# Patient Record
Sex: MALE | Race: WHITE | NOT HISPANIC OR LATINO
[De-identification: names, ages, dates, MRNs, and addresses within clinical notes are randomized per-mention and may not be internally consistent; named-entity substitution may affect disease eponyms.]

---

## 2017-12-22 ENCOUNTER — APPOINTMENT (OUTPATIENT)
Dept: CARDIOLOGY | Facility: CLINIC | Age: 47
End: 2017-12-22
Payer: COMMERCIAL

## 2017-12-22 PROCEDURE — 99214 OFFICE O/P EST MOD 30 MIN: CPT

## 2017-12-22 PROCEDURE — 93000 ELECTROCARDIOGRAM COMPLETE: CPT

## 2018-05-25 ENCOUNTER — RECORD ABSTRACTING (OUTPATIENT)
Age: 48
End: 2018-05-25

## 2018-05-25 DIAGNOSIS — Z78.9 OTHER SPECIFIED HEALTH STATUS: ICD-10-CM

## 2018-05-31 ENCOUNTER — APPOINTMENT (OUTPATIENT)
Dept: CARDIOLOGY | Facility: CLINIC | Age: 48
End: 2018-05-31
Payer: COMMERCIAL

## 2018-05-31 VITALS
RESPIRATION RATE: 18 BRPM | DIASTOLIC BLOOD PRESSURE: 65 MMHG | HEART RATE: 66 BPM | SYSTOLIC BLOOD PRESSURE: 110 MMHG | WEIGHT: 150 LBS | BODY MASS INDEX: 25.61 KG/M2 | HEIGHT: 64 IN

## 2018-05-31 DIAGNOSIS — H90.5 UNSPECIFIED SENSORINEURAL HEARING LOSS: ICD-10-CM

## 2018-05-31 PROCEDURE — 93306 TTE W/DOPPLER COMPLETE: CPT

## 2018-05-31 PROCEDURE — 93000 ELECTROCARDIOGRAM COMPLETE: CPT

## 2018-05-31 PROCEDURE — 99214 OFFICE O/P EST MOD 30 MIN: CPT

## 2018-11-06 ENCOUNTER — RX RENEWAL (OUTPATIENT)
Age: 48
End: 2018-11-06

## 2018-11-08 ENCOUNTER — RX RENEWAL (OUTPATIENT)
Age: 48
End: 2018-11-08

## 2018-11-12 ENCOUNTER — APPOINTMENT (OUTPATIENT)
Dept: CARDIOLOGY | Facility: CLINIC | Age: 48
End: 2018-11-12
Payer: COMMERCIAL

## 2018-11-12 VITALS
HEART RATE: 62 BPM | DIASTOLIC BLOOD PRESSURE: 58 MMHG | SYSTOLIC BLOOD PRESSURE: 100 MMHG | BODY MASS INDEX: 26.46 KG/M2 | HEIGHT: 64 IN | RESPIRATION RATE: 18 BRPM | WEIGHT: 155 LBS

## 2018-11-12 PROCEDURE — 99214 OFFICE O/P EST MOD 30 MIN: CPT

## 2018-11-12 PROCEDURE — 93306 TTE W/DOPPLER COMPLETE: CPT

## 2018-11-12 PROCEDURE — 93000 ELECTROCARDIOGRAM COMPLETE: CPT

## 2018-11-12 NOTE — REASON FOR VISIT
[FreeTextEntry1] : Patient presents for cardiac reevaluation with no new specific complaints.\par Exercises avidly without any difficulty.\par No other new intercurrent medical problems\par Here today for an echocardiogram and to review.

## 2018-11-12 NOTE — PHYSICAL EXAM
[FreeTextEntry1] :                    Well appearing and nourished with no obvious deformities or distress.\par \par Eyes: \par No conjunctival injection and no xanthelasmas.\par HEENT: \par Normocephalic.Normal oral mucosa. No pallor or cyanosis\par Neck: \par No jugular venous distension. with normal A and V wave forms. No palpable adenopathy.\par Cardiovascular: \par Normal rate and rhythm with normal S1, S2 and a grade 2/6 systolic murmur, 2/6 diastolic blow. Distal arterial pulses are normal. No significant peripheral edema.\par Pulmonary: \par Lungs are clear to auscultation and percussion. Normal respiratory pattern without any accessory muscle use\par Abdomen: \par Soft, non-tender ; no palpable organomegaly or masses.\par Extremities:\par No digital clubbing, cyanosis or ischemic changes.\par Skin: \par No skin lesions, rashes, ulcers or xanthomas.\par Psychiatric: \par Alert and oriented to person, place and time. Left ear sensorineural hearing loss. Appropriate mood and affect.

## 2018-11-12 NOTE — HISTORY OF PRESENT ILLNESS
[FreeTextEntry1] : The patient presents here for cardiac reevaluation with regard to:\par 1.	A bicuspid aortic valve.\par 2.	Severe aortic insufficiency.\par 3.	A thoracic aortic aneurysm.\par 4.	Left ventricular dilatation.\par \par Physically fairly active, but not exercising as much as he had been.  Nonetheless, denies any shortness of breath, chest pain, or palpitations.\par \par Other medical issues are that of acoustic neuroma with sensorineural hearing loss, left sided.\par \par A cardiac MRI performed 6/12/18 revealed the following:\par A dilated left ventricle with an ejection fraction of 52%.\par Mildly dilated right ventricle with normal function\par Left atrial dilatation\par A bicuspid aortic valve with severe regurgitation.\par A dilated ascending aorta maximal diameter 4.3 cm.

## 2019-03-25 ENCOUNTER — APPOINTMENT (OUTPATIENT)
Dept: OTOLARYNGOLOGY | Facility: CLINIC | Age: 49
End: 2019-03-25
Payer: COMMERCIAL

## 2019-03-25 VITALS
SYSTOLIC BLOOD PRESSURE: 102 MMHG | DIASTOLIC BLOOD PRESSURE: 66 MMHG | BODY MASS INDEX: 26.46 KG/M2 | WEIGHT: 155 LBS | HEIGHT: 64 IN | HEART RATE: 62 BPM

## 2019-03-25 PROCEDURE — 99204 OFFICE O/P NEW MOD 45 MIN: CPT | Mod: 25

## 2019-03-25 PROCEDURE — 92567 TYMPANOMETRY: CPT

## 2019-03-25 PROCEDURE — 92557 COMPREHENSIVE HEARING TEST: CPT

## 2019-03-25 RX ORDER — METHYLPHENIDATE HYDROCHLORIDE 5 MG/1
5 TABLET ORAL
Refills: 0 | Status: DISCONTINUED | COMMUNITY
End: 2019-03-25

## 2019-04-05 NOTE — REASON FOR VISIT
[Spouse] : spouse [FreeTextEntry2] : saw Dr. Gaines numerous years ago; here for f/u for hearing check

## 2019-04-05 NOTE — HISTORY OF PRESENT ILLNESS
[de-identified] : 49 y/o male here who saw Dr. Ocampo numerous years ago; here for f/u for hearing check. Pt has hearing loss of the left ear. No history of hearing aides and acoustic neuroma- craniotomy done in 3/2012. Pt occasional c/o of numbness to the temporal area; resolving on its own, due to stress as per pt. Pt denies otalgia, otorrhea, ear infections, tinnitus, dizziness, vertigo or headaches related to hearing. Last saw Dr Muñoz in 2017 - MRI no growth (5 yr anniversary) - no vertigo. Does not feel like needs aid.  Had sound bite in past - did not like it.

## 2019-05-31 ENCOUNTER — APPOINTMENT (OUTPATIENT)
Dept: CARDIOLOGY | Facility: CLINIC | Age: 49
End: 2019-05-31
Payer: COMMERCIAL

## 2019-05-31 PROCEDURE — 93306 TTE W/DOPPLER COMPLETE: CPT

## 2019-06-17 ENCOUNTER — OTHER (OUTPATIENT)
Age: 49
End: 2019-06-17

## 2019-06-27 ENCOUNTER — APPOINTMENT (OUTPATIENT)
Dept: CARDIOLOGY | Facility: CLINIC | Age: 49
End: 2019-06-27
Payer: COMMERCIAL

## 2019-06-27 ENCOUNTER — RECORD ABSTRACTING (OUTPATIENT)
Age: 49
End: 2019-06-27

## 2019-06-27 PROCEDURE — 93308 TTE F-UP OR LMTD: CPT

## 2019-06-27 PROCEDURE — ZZZZZ: CPT

## 2019-09-06 ENCOUNTER — OTHER (OUTPATIENT)
Age: 49
End: 2019-09-06

## 2020-02-27 ENCOUNTER — APPOINTMENT (OUTPATIENT)
Dept: CARDIOLOGY | Facility: CLINIC | Age: 50
End: 2020-02-27
Payer: COMMERCIAL

## 2020-02-27 ENCOUNTER — NON-APPOINTMENT (OUTPATIENT)
Age: 50
End: 2020-02-27

## 2020-02-27 VITALS
RESPIRATION RATE: 16 BRPM | SYSTOLIC BLOOD PRESSURE: 98 MMHG | BODY MASS INDEX: 27.14 KG/M2 | HEIGHT: 64 IN | HEART RATE: 60 BPM | DIASTOLIC BLOOD PRESSURE: 60 MMHG | WEIGHT: 159 LBS | OXYGEN SATURATION: 98 %

## 2020-02-27 PROCEDURE — 93306 TTE W/DOPPLER COMPLETE: CPT

## 2020-02-27 PROCEDURE — 99214 OFFICE O/P EST MOD 30 MIN: CPT

## 2020-02-27 PROCEDURE — 93000 ELECTROCARDIOGRAM COMPLETE: CPT

## 2020-02-27 NOTE — REASON FOR VISIT
[FreeTextEntry1] :  49 year old male  patient presents here for cardiac reevaluation with regard to:\par 1.	A bicuspid aortic valve.\par 2.	Severe aortic insufficiency.\par 3.	A thoracic aortic aneurysm.\par 4.	Left ventricular dilatation.

## 2020-02-27 NOTE — HISTORY OF PRESENT ILLNESS
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  Nonetheless, denies any shortness of breath, chest pain, or palpitations.\par \par Drinks 3+ cups of coffee a day.\par Compliant with his Quinapril.\par \par Other medical issues are that of acoustic neuroma with sensorineural hearing loss, left sided.\par \par 8/30/19 Cardiac MRI reviewed\par Moderate aortic insufficieny\par Mild enlargement of the aortic root measuring 4.2 cm\par Mod. enlargement of the ascending aorta measuring 4.8 cm\par Sev. enlargement of the left ventricle   EF = 50%\par RV EF 51%\par \par Results of his echocardiogram will be reviewed.\par \par

## 2020-02-27 NOTE — ASSESSMENT
[FreeTextEntry1] : ECG: Normal sinus is 66 BPM , occ. PVCs \par \par Echo 2/27/20\par EF 55-60%\par Dilated cardiomyopathy 6.5 cm\par Aortic valve is bicuspid\par Severe AI\par Mod. dilation of the ascending aorta measuring 4.40 cm\par \par \par 8/30/19 Cardiac MRI \par Moderate aortic insufficieny\par Mild enlargement of the aortic root measuring 4.2 cm\par Mod. enlargement of the ascending aorta measuring 4.8 cm\par Sev. enlargement of the left ventricle \par LV EF 50%\par \par A cardiac MRI performed 6/12/18 revealed the following:\par A dilated left ventricle with an ejection fraction of 52%.\par Mildly dilated right ventricle with normal function\par Left atrial dilatation\par A bicuspid aortic valve with severe regurgitation.\par A dilated ascending aorta maximal diameter 4.3 cm.\par \par \par Echocardiogram performed today: 11/12/18:\par Moderately dilated left ventricle with a left ventricular ejection fraction of 55-60%.\par A bicuspid aortic valve with severe insufficiency\par Moderately dilated ascending aorta unchanged from prior.\par Compared with the prior echocardiogram 5/31/18, there seems to be a decrease in left ventricular end-diastolic dimension and an improvement in the left ventricular ejection fraction.\par \par Lab Data 5/16/19\par Chol. 254\par HDL 59\par  ( 117 ,3/21/18)\par Tri. 121\par LFTs WNL\par HGB 14.9\par Creat. 0.88\par \par \par Echocardiogram 5/31/18\par Moderately dilated left ventricle with low normal ventricular systolic function. LVEF approximately 50-55%.\par Bicuspid aortic valve with severe  insufficiency\par Moderately severe dilatation of the ascending aorta measuring maximally 4.6 cm.\par \par Impression\par 1. May echo revealing linear echodensity suggestive of aortic dissection, EF 55%. This was  repeated  in June     which was negative. Likely artifact\par \par -  August cardiac MRI measuring LV EF at 50 % and ascending aorta at 4.2 cm No Aortic disection\par \par - 2/2/20 echo with a dilated LV measuring 6.5cm, EF of 55%, ascending aorta measuring 4.40 cm, all unchanged and with absence of any related symptoms..\par \par 2. Severe aortic insufficiency with bicuspid aortic valve and no stenosis.\par \par 4. No exertional discomfort \par \par 5. Lipids suboptimal, LDL from may 171. Repeat blood work pending.\par \par 6.Occ.  PVCs new on ECG which may be reflective of increased caffeine and personal stressors.\par \par Plan:\par 1.No changes to his activity limitations. He remains limited in sudden severe exertion such that he will need to Valsalva.\par \par 2.Continuation of current meds.\par \par 3. Decrease intake intake to 1 cup daily.\par \par 4. Stress echo in the fall.\par \par 5. Encouraged a 10 lb weight loss.\par \par 6. If LDL does not imrpove will discuss starting statin therapy during his f/u.\par \par 6 month follow up\par \par labs to check lytes and lipids\par

## 2020-02-27 NOTE — ADDENDUM
[FreeTextEntry1] : Patient and reviewed with him in detail the results of a cardiac MRI D. date 8/30/19:\par Significant findings included\par 1. Moderate aortic insufficiency. This is in contrast to severe insufficiency seen on all prior data such as echoes and prior MRI from January 2018. Advised the patient that I thought this was an error.\par 2. The ascending aorta 4.8 cm. This is an increase from 4.5 seen on echo and 4.2 seen on prior MRI.\par 3. Left ventricular ejection fraction 50%. Prior data had indicated 52% on MRI 2018 and 55% on echo June 2019.\par \par The patient remains overall fairly clinically stable.\par He should have an echocardiogram every 6 months and an echo with a followup visit should be performed in January 2020. No changes in management.

## 2020-03-05 ENCOUNTER — APPOINTMENT (OUTPATIENT)
Dept: CARDIOLOGY | Facility: CLINIC | Age: 50
End: 2020-03-05

## 2020-11-17 ENCOUNTER — APPOINTMENT (OUTPATIENT)
Dept: CARDIOLOGY | Facility: CLINIC | Age: 50
End: 2020-11-17
Payer: COMMERCIAL

## 2020-11-17 PROCEDURE — 93306 TTE W/DOPPLER COMPLETE: CPT

## 2020-11-17 PROCEDURE — 99072 ADDL SUPL MATRL&STAF TM PHE: CPT

## 2020-11-23 ENCOUNTER — APPOINTMENT (OUTPATIENT)
Dept: CARDIOLOGY | Facility: CLINIC | Age: 50
End: 2020-11-23

## 2020-12-21 ENCOUNTER — APPOINTMENT (OUTPATIENT)
Dept: CARDIOLOGY | Facility: CLINIC | Age: 50
End: 2020-12-21
Payer: COMMERCIAL

## 2020-12-21 VITALS
DIASTOLIC BLOOD PRESSURE: 62 MMHG | HEIGHT: 64 IN | WEIGHT: 166 LBS | TEMPERATURE: 98.2 F | RESPIRATION RATE: 16 BRPM | BODY MASS INDEX: 28.34 KG/M2 | HEART RATE: 74 BPM | SYSTOLIC BLOOD PRESSURE: 122 MMHG | OXYGEN SATURATION: 98 %

## 2020-12-21 PROCEDURE — 99214 OFFICE O/P EST MOD 30 MIN: CPT

## 2020-12-21 PROCEDURE — 93000 ELECTROCARDIOGRAM COMPLETE: CPT

## 2020-12-21 PROCEDURE — 99072 ADDL SUPL MATRL&STAF TM PHE: CPT

## 2020-12-21 NOTE — REASON FOR VISIT
[FreeTextEntry1] :  50 year old male  patient presents here for cardiac reevaluation with regard to:\par 1.	A bicuspid aortic valve.\par 2.	Severe aortic insufficiency.\par 3.	A thoracic aortic aneurysm.\par 4.	Left ventricular dilatation.

## 2020-12-21 NOTE — ASSESSMENT
[FreeTextEntry1] : ECG: Normal sinus is 74  BPM , occ. PVCs \par \par \par Echo 11/17/2020\par EF 60%\par Mod- Sev. left ventricle size.\par Trace Tricuspid regurgitation \par Aortic valve is bicuspid\par Mild. aortic stenosis\par Severe aortic regurgitation Mild tricuspid . \par Trace pulmonic  regurgitation \par Moderate dilation of the ascending aorta 4.7 cm  ( 4.40 in 2/2020)\par \par Echo 2/27/20\par EF 55-60%\par Dilated cardiomyopathy 6.5 cm\par Aortic valve is bicuspid\par Severe AI\par Mod. dilation of the ascending aorta measuring 4.40 cm\par \par \par 8/30/19 Cardiac MRI \par Moderate aortic insufficieny\par Mild enlargement of the aortic root measuring 4.2 cm\par Mod. enlargement of the ascending aorta measuring 4.8 cm\par Sev. enlargement of the left ventricle \par LV EF 50%\par \par A cardiac MRI performed 6/12/18 revealed the following:\par A dilated left ventricle with an ejection fraction of 52%.\par Mildly dilated right ventricle with normal function\par Left atrial dilatation\par A bicuspid aortic valve with severe regurgitation.\par A dilated ascending aorta maximal diameter 4.3 cm.\par \par \par Echocardiogram performed today: 11/12/18:\par Moderately dilated left ventricle with a left ventricular ejection fraction of 55-60%.\par A bicuspid aortic valve with severe insufficiency\par Moderately dilated ascending aorta unchanged from prior.\par Compared with the prior echocardiogram 5/31/18, there seems to be a decrease in left ventricular end-diastolic dimension and an improvement in the left ventricular ejection fraction.\par \par Lab Data 5/16/19\par Chol. 254\par HDL 59\par  ( 117 ,3/21/18)\par Tri. 121\par LFTs WNL\par HGB 14.9\par Creat. 0.88\par \par \par Echocardiogram 5/31/18\par Moderately dilated left ventricle with low normal ventricular systolic function. LVEF approximately 50-55%.\par Bicuspid aortic valve with severe  insufficiency\par Moderately severe dilatation of the ascending aorta measuring maximally 4.6 cm.\par \par Impression\par 1.  Current echocardiogram showing that the ascending aorta measuring about 4.6 cm.\par Notably many prior echoes had been measured 4.5 cm and the MRI 2019 was 4.8 cm.\par As such, do not think this represents a significant interval change.\par \par -  August cardiac MRI measuring LV EF at 50 % and ascending aorta at 4.8 cm No Aortic disection\par \par 2. Severe aortic insufficiency with bicuspid aortic valve and mild stenosis.\par \par 4. No exertional discomfort \par \par 5. Lipids suboptimal, LDL from may 171. Repeat blood work pending.\par \par 6.PVCs not seen on current ECG, not felt by patient nor heard on exam\par \par Plan:\par 1.No changes to his activity limitations. He remains limited in sudden severe exertion such that he will need to Valsalva.\par \par 2.Continuation of current meds.\par \par 3. Decrease intake intake coffee to 1 cup daily.\par \par 4.  Patient has a known history of significant cardiac enlargement, fluctuating left ventricular ejection fraction, severe aortic insufficiency in the face of a bicuspid aortic valve, and continues to engage in exercise, especially long distance bicycling.  Have to this point managed to continue to follow him conservatively.  Believe that a stress echo would be important at this juncture to help us objectively assess #1 functional capacity and #2 the left ventricular response to exercise.  Clearly if there were blunting of the left ventricular ejection fraction response to exercise or dilatation of the left ventricle, there would be a greater impetus towards moving expeditiously to aortic valve and root replacement.  Stress echo when insurance authorization is granted\par \par 5. Encouraged a 10 lb weight loss.\par \par 6.  Repeat the lab work now and if LDL does not improve will discuss starting statin therapy during his f/u.\par \par 6 month follow up\par \par \par

## 2021-01-05 ENCOUNTER — NON-APPOINTMENT (OUTPATIENT)
Age: 51
End: 2021-01-05

## 2021-03-11 ENCOUNTER — APPOINTMENT (OUTPATIENT)
Dept: CARDIOLOGY | Facility: CLINIC | Age: 51
End: 2021-03-11

## 2021-04-15 ENCOUNTER — NON-APPOINTMENT (OUTPATIENT)
Age: 51
End: 2021-04-15

## 2021-04-23 ENCOUNTER — RX RENEWAL (OUTPATIENT)
Age: 51
End: 2021-04-23

## 2021-06-14 ENCOUNTER — APPOINTMENT (OUTPATIENT)
Dept: CARDIOLOGY | Facility: CLINIC | Age: 51
End: 2021-06-14
Payer: COMMERCIAL

## 2021-06-14 VITALS
WEIGHT: 165 LBS | BODY MASS INDEX: 28.17 KG/M2 | HEART RATE: 64 BPM | RESPIRATION RATE: 16 BRPM | SYSTOLIC BLOOD PRESSURE: 110 MMHG | OXYGEN SATURATION: 98 % | DIASTOLIC BLOOD PRESSURE: 58 MMHG | TEMPERATURE: 98 F | HEIGHT: 64 IN

## 2021-06-14 PROCEDURE — 99214 OFFICE O/P EST MOD 30 MIN: CPT

## 2021-06-14 PROCEDURE — 99072 ADDL SUPL MATRL&STAF TM PHE: CPT

## 2021-06-14 PROCEDURE — 93000 ELECTROCARDIOGRAM COMPLETE: CPT

## 2021-06-14 NOTE — ASSESSMENT
[FreeTextEntry1] : ECG: Normal sinus is 64  BPM , no significant ST or T wave changes\par \par Lab Data \par 5/16/19            3/24/2021\par Chol. 254              165\par HDL 59                   61\par                  90\par Tri. 121                  72\par LFTs WNL\par HGB 14.9\par Creat. 0.88\par \par \par Echo 11/17/2020\par EF 60%\par Mod- Sev. left ventricle size.\par Trace Tricuspid regurgitation \par Aortic valve is bicuspid\par Mild. aortic stenosis\par Severe aortic regurgitation Mild tricuspid . \par Trace pulmonic  regurgitation \par Moderate dilation of the ascending aorta 4.7 cm  ( 4.40 in 2/2020)\par \par Echo 2/27/20\par EF 55-60%\par Dilated cardiomyopathy 6.5 cm\par Aortic valve is bicuspid\par Severe AI\par Mod. dilation of the ascending aorta measuring 4.40 cm\par \par \par 8/30/19 Cardiac MRI \par Moderate aortic insufficieny\par Mild enlargement of the aortic root measuring 4.2 cm\par Mod. enlargement of the ascending aorta measuring 4.8 cm\par Sev. enlargement of the left ventricle \par LV EF 50%\par \par A cardiac MRI performed 6/12/18 revealed the following:\par A dilated left ventricle with an ejection fraction of 52%.\par Mildly dilated right ventricle with normal function\par Left atrial dilatation\par A bicuspid aortic valve with severe regurgitation.\par A dilated ascending aorta maximal diameter 4.3 cm.\par \par \par Echocardiogram performed today: 11/12/18:\par Moderately dilated left ventricle with a left ventricular ejection fraction of 55-60%.\par A bicuspid aortic valve with severe insufficiency\par Moderately dilated ascending aorta unchanged from prior.\par Compared with the prior echocardiogram 5/31/18, there seems to be a decrease in left ventricular end-diastolic dimension and an improvement in the left ventricular ejection fraction.\par Echocardiogram 5/31/18\par Moderately dilated left ventricle with low normal ventricular systolic function. LVEF approximately 50-55%.\par Bicuspid aortic valve with severe  insufficiency\par Moderately severe dilatation of the ascending aorta measuring maximally 4.6 cm.\par \par Impression\par Patient presents for evaluation of longstanding history of marked cardiomegaly in association with a bicuspid aortic valve with severe aortic insufficiency and an ascending aortic aneurysm.  Gaging the symptoms and determination of whether or not the patient requires continued ongoing conservative therapy versus surgical therapy has been a delicate balancing act, requiring frequent clinical evaluations, noninvasive testing including echocardiography and stress echocardiography.\par \par 1.  The most recent echocardiogram showing that the ascending aorta measuring about 4.6 cm.\par Notably many prior echoes had been measured 4.5 cm and the MRI 2019 was 4.8 cm.\par As such, do not think this represents a significant interval change.\par \par -  August cardiac MRI measuring LV EF at 50 % and ascending aorta at 4.8 cm No Aortic disection\par \par 2. Severe aortic insufficiency with bicuspid aortic valve and mild stenosis and significant left ventricular enlargement\par \par 4. No exertional discomfort but does note that it is difficult for him to achieve levels of exercise that others he rides with cannot\par \par 5. Lipids demonstrate substantial improvement with low-dose rosuvastatin\par \par 6.  Stress testing previously ordered declined by his insurance and as such not yet available for review\par \par Plan:\par 1.No changes to his activity limitations. He remains limited in sudden severe exertion such that he will need to Valsalva.\par \par 2.Continuation of current meds.\par \par 3. Decrease intake intake coffee to 1 cup daily.\par \par 4.  Patient has a known history of significant cardiac enlargement, fluctuating left ventricular ejection fraction, severe aortic insufficiency in the face of a bicuspid aortic valve, and continues to engage in exercise, especially long distance bicycling.  Have to this point managed to continue to follow him conservatively.  Believe that a stress echo would be important at this juncture to help us objectively assess #1 functional capacity and #2 the left ventricular response to exercise.  Clearly if there were blunting of the left ventricular ejection fraction response to exercise or dilatation of the left ventricle, there would be a greater impetus towards moving expeditiously to aortic valve and root replacement.  Stress echo when insurance authorization is granted\par \par 5. Encouraged a 10 lb weight loss.\par \par 6.  Repeat the lab work now in a few months\par \par 6 month follow up\par \par \par

## 2021-06-14 NOTE — REVIEW OF SYSTEMS
[Weight Gain (___ Lbs)] : no recent weight gain [Weight Loss (___ Lbs)] : no recent weight loss [FreeTextEntry2] : Other than as documented here and in the HPI, the thirteen point ROS is negative

## 2021-06-14 NOTE — DISCUSSION/SUMMARY
[FreeTextEntry1] : Due to elevated lipid panel collected on 12/23/20 , total cholesterol 258. Mr. Tolentino will be rx'd Rosuvastatin 10 mg per Dr. Robbins. \par \par Repeat BW to be done in 3 months and mailed to his residence. \par Mr. Tolentino was updated by Dr. Robbins via phone conversation.

## 2021-06-14 NOTE — HISTORY OF PRESENT ILLNESS
[FreeTextEntry1] : Physically fairly active, but not  regularly exercising.There has been an increase in his personal stressors due to ill parents ( Mom - passed; Dad - will Lymphoma)   and his business.  Nonetheless, denies any shortness of breath, chest pain, or palpitations.\par \par Drinks 3+ cups of coffee a day.\par Compliant with his Quinapril.\par \par Other medical issues are that of resected  acoustic neuroma with sensorineural hearing loss, left sided.\par \par 8/30/19 Cardiac MRI \par Moderate aortic insufficieny\par Mild enlargement of the aortic root measuring 4.2 cm\par Mod. enlargement of the ascending aorta measuring 4.8 cm\par Sev. enlargement of the left ventricle   EF = 50%\par RV EF 51%\par \par Stress Echo was disallowed by his insurer\par \par

## 2021-07-22 LAB
ALBUMIN SERPL ELPH-MCNC: 4.8 G/DL
ALP BLD-CCNC: 86 U/L
ALT SERPL-CCNC: 23 U/L
ANION GAP SERPL CALC-SCNC: 12 MMOL/L
AST SERPL-CCNC: 20 U/L
BILIRUB SERPL-MCNC: 0.8 MG/DL
BUN SERPL-MCNC: 26 MG/DL
CALCIUM SERPL-MCNC: 9.6 MG/DL
CHLORIDE SERPL-SCNC: 103 MMOL/L
CHOLEST SERPL-MCNC: 155 MG/DL
CO2 SERPL-SCNC: 24 MMOL/L
CREAT SERPL-MCNC: 1.05 MG/DL
ESTIMATED AVERAGE GLUCOSE: 105 MG/DL
GLUCOSE SERPL-MCNC: 117 MG/DL
HBA1C MFR BLD HPLC: 5.3 %
HDLC SERPL-MCNC: 63 MG/DL
LDLC SERPL CALC-MCNC: 80 MG/DL
NONHDLC SERPL-MCNC: 92 MG/DL
POTASSIUM SERPL-SCNC: 4.4 MMOL/L
PROT SERPL-MCNC: 6.4 G/DL
SODIUM SERPL-SCNC: 139 MMOL/L
TRIGL SERPL-MCNC: 59 MG/DL

## 2021-07-23 ENCOUNTER — NON-APPOINTMENT (OUTPATIENT)
Age: 51
End: 2021-07-23

## 2021-08-05 ENCOUNTER — APPOINTMENT (OUTPATIENT)
Dept: CARDIOLOGY | Facility: CLINIC | Age: 51
End: 2021-08-05
Payer: COMMERCIAL

## 2021-08-05 PROCEDURE — 93351 STRESS TTE COMPLETE: CPT

## 2021-08-05 RX ADMIN — PERFLUTREN MG/ML: 6.52 INJECTION, SUSPENSION INTRAVENOUS at 00:00

## 2021-08-06 RX ORDER — PERFLUTREN 6.52 MG/ML
6.52 INJECTION, SUSPENSION INTRAVENOUS
Qty: 4 | Refills: 0 | Status: COMPLETED | OUTPATIENT
Start: 2021-08-05

## 2021-08-13 PROBLEM — D33.3 VESTIBULAR SCHWANNOMA: Status: ACTIVE | Noted: 2019-04-05

## 2021-08-16 ENCOUNTER — APPOINTMENT (OUTPATIENT)
Dept: CARDIOTHORACIC SURGERY | Facility: CLINIC | Age: 51
End: 2021-08-16
Payer: COMMERCIAL

## 2021-08-16 ENCOUNTER — OUTPATIENT (OUTPATIENT)
Dept: OUTPATIENT SERVICES | Facility: HOSPITAL | Age: 51
LOS: 1 days | End: 2021-08-16
Payer: COMMERCIAL

## 2021-08-16 VITALS — HEIGHT: 64 IN

## 2021-08-16 VITALS
OXYGEN SATURATION: 97 % | DIASTOLIC BLOOD PRESSURE: 68 MMHG | WEIGHT: 165 LBS | TEMPERATURE: 98.4 F | HEART RATE: 67 BPM | SYSTOLIC BLOOD PRESSURE: 155 MMHG | RESPIRATION RATE: 16 BRPM | BODY MASS INDEX: 28.32 KG/M2

## 2021-08-16 DIAGNOSIS — I35.0 NONRHEUMATIC AORTIC (VALVE) STENOSIS: ICD-10-CM

## 2021-08-16 DIAGNOSIS — D33.3 BENIGN NEOPLASM OF CRANIAL NERVES: ICD-10-CM

## 2021-08-16 PROCEDURE — 93306 TTE W/DOPPLER COMPLETE: CPT

## 2021-08-16 PROCEDURE — 93306 TTE W/DOPPLER COMPLETE: CPT | Mod: 26

## 2021-08-16 PROCEDURE — 99204 OFFICE O/P NEW MOD 45 MIN: CPT

## 2021-08-18 NOTE — END OF VISIT
[FreeTextEntry3] : \par I personally scribed for JIGAR GALARZA on Aug 16 2021  2:30PM . \par \par \par \par \par Physician Attestation:\par Documented by SHERRIE BENOIT acting as a scribe for JIGAR GALARZA 08/16/2021 . \par                 All medical record entries made by the Scribe were at my, JIGAR GALARZA , direction and personally dictated by me on 08/16/2021 . I have reviewed the chart and agree that the record accurately reflects my personal performance of the history, physical exam, assessment and plan\par \par

## 2021-08-18 NOTE — HISTORY OF PRESENT ILLNESS
[FreeTextEntry1] : Mr. AGUIRRE is a 51 year old male with  past medical history of known Bicuspid aortic valve as a kid,Hyperlipidemia, Hypertension,  Vestibular schwannoma, Acoustic neuroma  s/p removal in 2012 with sensorineural hearing loss in the LT ear , Known aortic insufficiency as a child and known Thoracic aortic dilatation for 40 yrs with complaints of fatigue for 3 - 4 months . He was initially monitored by his pediatric cardiologist ( Dr.Michael dutta) and then by  for about 40 yrs with TTE annually.  His recent TTE on 11/17/20 revealed EF 60%, Mild thickening of the anterior and posterior mitral valve leaflets.Trace mitral regurgitation, Aortic valve is bicuspid. Mild to moderate aortic valve stenosis, Severe aortic regurgitation, Mild tricuspid regurgitation. There is moderate dilatation of the ascending aorta (4.7 cm). There is no evidence of pericardial effusion. He is fairly physically active. He rides bicycle  2 -3 hours per day with no difficulties  and recently had a 84 miles bike ride . He is referred by Dr.Abraham Robbins for initial evaluation and management for Aortic insufficiency and Thoracic aortic dilatation . Denies any chest pain, shortness of breath, palpitations, dizziness, syncope or pedal edema. \par -Family history of AV Replacement  , Father at the age of 84 ( Dr.Sunil Tang in [facility])  and Uncle at the age of 90. \par \par COVID Vaccine: Received 2 doses of Moderna , Last dose in Feb 2021\par \par

## 2021-08-18 NOTE — PHYSICAL EXAM
[General Appearance - Alert] : alert [General Appearance - In No Acute Distress] : in no acute distress [General Appearance - Well Nourished] : well nourished [General Appearance - Well Developed] : well developed [Sclera] : the sclera and conjunctiva were normal [PERRL With Normal Accommodation] : pupils were equal in size, round, and reactive to light [Outer Ear] : the ears and nose were normal in appearance [Hearing Threshold Finger Rub Not [place]] : hearing was normal [Both Tympanic Membranes Were Examined] : both tympanic membranes were normal [Neck Appearance] : the appearance of the neck was normal [] : no respiratory distress [Respiration, Rhythm And Depth] : normal respiratory rhythm and effort [Auscultation Breath Sounds / Voice Sounds] : lungs were clear to auscultation bilaterally [Apical Impulse] : the apical impulse was normal [Heart Rate And Rhythm] : heart rate was normal and rhythm regular [Examination Of The Chest] : the chest was normal in appearance [2+] : left 2+ [Breast Appearance] : normal in appearance [Bowel Sounds] : normal bowel sounds [Abdomen Soft] : soft [No CVA Tenderness] : no ~M costovertebral angle tenderness [Involuntary Movements] : no involuntary movements were seen [Abnormal Walk] : normal gait [Skin Color & Pigmentation] : normal skin color and pigmentation [Skin Turgor] : normal skin turgor [No Focal Deficits] : no focal deficits [Oriented To Time, Place, And Person] : oriented to person, place, and time [Impaired Insight] : insight and judgment were intact [Affect] : the affect was normal [Mood] : the mood was normal [Memory Recent] : recent memory was not impaired [Memory Remote] : remote memory was not impaired [FreeTextEntry1] : Deferred

## 2021-08-18 NOTE — REASON FOR VISIT
[Consultation] : a consultation visit [FreeTextEntry1] : severe aortic valve insufficiency and thoracic dilatation

## 2021-08-18 NOTE — ASSESSMENT
[FreeTextEntry1] : Mr. AGUIRRE is a 51 year old male with  past medical history of known Bicuspid aortic valve as a kid,Hyperlipidemia, Hypertension,  Vestibular schwannoma, Acoustic neuroma  s/p removal in 2012 with sensorineural hearing loss in the LT ear , Known aortic insufficiency as a child and known Thoracic aortic dilatation for 40 yrs with complaints of fatigue for 3 - 4 months.\par \par   reviewed the cardiac imaging, medical records and reports with patient and discussed the case. 8/16/21 TTE done, report pending \par \par 8/5/21 Exercise stress Echo revealed Abnormal stress Echo with no significant augmentation and some dilatation of the ventricle with stress. No significant regional wall motion abnormalities. Moderate to severely increased Left ventricular internal cavity size. EKG negative for ischemia. The patient developed shortness of breath and atypical chest pain. The duke treadmill score was 9, consistent with low risk. \par \par 11/17/20 TTE revealed EF 60%, Mild thickening of the anterior and posterior mitral valve leaflets.Trace mitral regurgitation, Aortic valve is bicuspid. Mild to moderate aortic valve stenosis, Severe aortic regurgitation, Mild tricuspid regurgitation. There is moderate dilatation of the ascending aorta (4.7 cm). There is no evidence of pericardial effusion. \par \par 8/30/19 Cardiac MRI revealed Moderate aortic insufficiency , functionally  Bicuspid aortic valve with fusion of RT and noncoronary cusps. [facility]  2.4 sq cm. Moderate enlargement of the ascending aorta 4.8 cm, LVEF 50%, Mild enlargement of the aortic root 4.2 cm. \par \par  discussed the risks , benefits and alternatives to surgery. Risks included but not limited to  bleeding , stroke, Myocardial Infarction, kidney problems,Blood transfusion ,permanent  pacemaker implantation,  infections and death.   quoted a low  operative mortality and complication risks.  also discussed the various approaches in detail. feel that the patient will benefit and is a candidate for a Ascending aorta replacement and Bio prosthetic aortic valve replacement  . All questions and concerns were addressed and patient agrees to proceed with the plan. \par \par Plan:\par 1) Ascending aorta replacement and Bio prosthetic aortic valve replacement on 9/2/21 \par 2) Cardiac Catherization to evaluate coronary arteries\par 3) May return to clinic on PRN basis \par \par

## 2021-08-18 NOTE — DATA REVIEWED
[FreeTextEntry1] : 8/16/21 TTE done, report pending \par \par 8/5/21 Exercise stress Echo revealed Abnormal stress Echo with no significant augmentation and some dilatation of the ventricle with stress. No significant regional wall motion abnormalities. Moderate to severely increased Left ventricular internal cavity size. EKG negative for ischemia. The patient developed shortness of breath and atypical chest pain. The duke treadmill score was 9, consistent with low risk. \par \par 11/17/20 TTE revealed EF 60%, Mild thickening of the anterior and posterior mitral valve leaflets.Trace mitral regurgitation, Aortic valve is bicuspid. Mild to moderate aortic valve stenosis, Severe aortic regurgitation, Mild tricuspid regurgitation. There is moderate dilatation of the ascending aorta (4.7 cm). There is no evidence of pericardial effusion. \par \par 8/30/19 Cardiac MRI revealed Moderate aortic insufficiency , functionally  Bicuspid aortic valve with fusion of RT and noncoronary cusps. [facility]  2.4 sq cm. Moderate enlargement of the ascending aorta 4.8 cm, LVEF 50%, Mild enlargement of the aortic root 4.2 cm. \par

## 2021-08-18 NOTE — CONSULT LETTER
[Dear  ___] : Dear  [unfilled], [Courtesy Letter:] : I had the pleasure of seeing your patient, [unfilled], in my office today. [Please see my note below.] : Please see my note below. [Consult Closing:] : Thank you very much for allowing me to participate in the care of this patient.  If you have any questions, please do not hesitate to contact me. [Sincerely,] : Sincerely, [FreeTextEntry2] : Dr.Abraham Robbins [FreeTextEntry3] : Salbador Vogel MD\par  & \par \par Cardiovascular & Thoracic Surgery\par [facility] \par [address]\par [facility] [address]\par \par

## 2021-08-18 NOTE — REVIEW OF SYSTEMS
[Feeling Tired] : feeling tired [Negative] : Heme/Lymph [Loss Of Hearing] : hearing loss [Chest Pain] : no chest pain [Palpitations] : no palpitations [Lower Ext Edema] : no extremity edema [Dizziness] : no dizziness [Fainting] : no fainting [Easy Bleeding] : no tendency for easy bleeding [Easy Bruising] : no tendency for easy bruising [FreeTextEntry4] : LT ear hearing loss

## 2021-08-19 DIAGNOSIS — Z01.818 ENCOUNTER FOR OTHER PREPROCEDURAL EXAMINATION: ICD-10-CM

## 2021-08-20 ENCOUNTER — APPOINTMENT (OUTPATIENT)
Dept: DISASTER EMERGENCY | Facility: CLINIC | Age: 51
End: 2021-08-20

## 2021-08-21 LAB — SARS-COV-2 N GENE NPH QL NAA+PROBE: NOT DETECTED

## 2021-08-23 ENCOUNTER — OUTPATIENT (OUTPATIENT)
Dept: OUTPATIENT SERVICES | Facility: HOSPITAL | Age: 51
LOS: 1 days | End: 2021-08-23
Payer: COMMERCIAL

## 2021-08-23 VITALS
OXYGEN SATURATION: 98 % | RESPIRATION RATE: 18 BRPM | SYSTOLIC BLOOD PRESSURE: 123 MMHG | DIASTOLIC BLOOD PRESSURE: 66 MMHG | HEART RATE: 67 BPM | TEMPERATURE: 98 F

## 2021-08-23 VITALS
DIASTOLIC BLOOD PRESSURE: 78 MMHG | HEART RATE: 66 BPM | RESPIRATION RATE: 16 BRPM | SYSTOLIC BLOOD PRESSURE: 125 MMHG | OXYGEN SATURATION: 97 %

## 2021-08-23 DIAGNOSIS — I35.1 NONRHEUMATIC AORTIC (VALVE) INSUFFICIENCY: ICD-10-CM

## 2021-08-23 DIAGNOSIS — Z98.890 OTHER SPECIFIED POSTPROCEDURAL STATES: Chronic | ICD-10-CM

## 2021-08-23 LAB
ANION GAP SERPL CALC-SCNC: 11 MMOL/L — SIGNIFICANT CHANGE UP (ref 5–17)
BUN SERPL-MCNC: 18 MG/DL — SIGNIFICANT CHANGE UP (ref 7–23)
CALCIUM SERPL-MCNC: 9.1 MG/DL — SIGNIFICANT CHANGE UP (ref 8.4–10.5)
CHLORIDE SERPL-SCNC: 105 MMOL/L — SIGNIFICANT CHANGE UP (ref 96–108)
CO2 SERPL-SCNC: 25 MMOL/L — SIGNIFICANT CHANGE UP (ref 22–31)
CREAT SERPL-MCNC: 1.01 MG/DL — SIGNIFICANT CHANGE UP (ref 0.5–1.3)
GLUCOSE SERPL-MCNC: 101 MG/DL — HIGH (ref 70–99)
HCT VFR BLD CALC: 41.4 % — SIGNIFICANT CHANGE UP (ref 39–50)
HGB BLD-MCNC: 13.8 G/DL — SIGNIFICANT CHANGE UP (ref 13–17)
MCHC RBC-ENTMCNC: 31 PG — SIGNIFICANT CHANGE UP (ref 27–34)
MCHC RBC-ENTMCNC: 33.3 GM/DL — SIGNIFICANT CHANGE UP (ref 32–36)
MCV RBC AUTO: 93 FL — SIGNIFICANT CHANGE UP (ref 80–100)
NRBC # BLD: 0 /100 WBCS — SIGNIFICANT CHANGE UP (ref 0–0)
PLATELET # BLD AUTO: 151 K/UL — SIGNIFICANT CHANGE UP (ref 150–400)
POTASSIUM SERPL-MCNC: 4 MMOL/L — SIGNIFICANT CHANGE UP (ref 3.5–5.3)
POTASSIUM SERPL-SCNC: 4 MMOL/L — SIGNIFICANT CHANGE UP (ref 3.5–5.3)
RBC # BLD: 4.45 M/UL — SIGNIFICANT CHANGE UP (ref 4.2–5.8)
RBC # FLD: 13.2 % — SIGNIFICANT CHANGE UP (ref 10.3–14.5)
SODIUM SERPL-SCNC: 141 MMOL/L — SIGNIFICANT CHANGE UP (ref 135–145)
WBC # BLD: 5.1 K/UL — SIGNIFICANT CHANGE UP (ref 3.8–10.5)
WBC # FLD AUTO: 5.1 K/UL — SIGNIFICANT CHANGE UP (ref 3.8–10.5)

## 2021-08-23 PROCEDURE — 93454 CORONARY ARTERY ANGIO S&I: CPT

## 2021-08-23 PROCEDURE — 93005 ELECTROCARDIOGRAM TRACING: CPT

## 2021-08-23 PROCEDURE — C1769: CPT

## 2021-08-23 PROCEDURE — 80048 BASIC METABOLIC PNL TOTAL CA: CPT

## 2021-08-23 PROCEDURE — C1887: CPT

## 2021-08-23 PROCEDURE — 99152 MOD SED SAME PHYS/QHP 5/>YRS: CPT

## 2021-08-23 PROCEDURE — C1894: CPT

## 2021-08-23 PROCEDURE — 93010 ELECTROCARDIOGRAM REPORT: CPT

## 2021-08-23 PROCEDURE — 93454 CORONARY ARTERY ANGIO S&I: CPT | Mod: 26

## 2021-08-23 PROCEDURE — 99152 MOD SED SAME PHYS/QHP 5/>YRS: CPT | Mod: 59

## 2021-08-23 PROCEDURE — 85027 COMPLETE CBC AUTOMATED: CPT

## 2021-08-23 RX ORDER — SODIUM CHLORIDE 9 MG/ML
3 INJECTION INTRAMUSCULAR; INTRAVENOUS; SUBCUTANEOUS EVERY 8 HOURS
Refills: 0 | Status: DISCONTINUED | OUTPATIENT
Start: 2021-08-23 | End: 2021-09-06

## 2021-08-23 NOTE — H&P CARDIOLOGY - NSICDXPASTMEDICALHX_GEN_ALL_CORE_FT
PAST MEDICAL HISTORY:  Bicuspid aortic valve     Hearing loss     HLD (hyperlipidemia)     Left ventricular dilation     Severe aortic insufficiency     Thoracic aortic aneurysm     Vestibular schwannoma

## 2021-08-23 NOTE — ASU DISCHARGE PLAN (ADULT/PEDIATRIC) - CARE PROVIDER_API CALL
Salbador Vogel (MD)  Surgery; Surgical Critical Care; Thoracic and Cardiac Surgery  [address] [place] [address]  Phone: [phone]  Fax: [phone]  Established Patient  Follow Up Time: 2 weeks

## 2021-08-23 NOTE — H&P CARDIOLOGY - HISTORY OF PRESENT ILLNESS
51 year old male with significant PMHx of bicuspid aortic valve as a child, HLD, HTN, vestibular schwannoma, acoustic neuroma s/p removal in 2012 with sensorineural hearing loss in the left ear, aortic insufficiency as a child, and known thoracic aortic dilation for 40 years presents for [facility] pre ascending aorta replacement and bio prosthetic aortic valve replacement planned on 9/2/2021. Patient with c/o fatigue for the last 3-4 months. Patient was initially monitored by pediatric cardiologist (Dr. German dutta) and then by Dr. Robbins for 40 years with TTE annually. Recent TTE on 11/2020 EF 60%, severe aortic regurgitation. Patient was referred to  for evaluation and management of aortic insufficiency and thoracic aortic dilation with Dr. Vogel. 8/2021 exercise stress echo revealed No significant regional wall abnormalities. Patient referred to Dr. Nichole for further ischemic evaluation.    Patient currently denies chest pain, palpitations, orthopnea or PND.  51 year old male with significant PMHx of bicuspid aortic valve as a child, HLD, HTN, vestibular schwannoma, acoustic neuroma s/p removal in 2012 with sensorineural hearing loss in the left ear, aortic insufficiency as a child, and known thoracic aortic dilation for 40 years presents for [facility] pre ascending aorta replacement and bio prosthetic aortic valve replacement planned on 9/2/2021. Patient with c/o fatigue for the last 3-4 months. Patient was initially monitored by pediatric cardiologist (Dr. German dutta) and then by Dr. Robbins for 40 years with TTE annually. Recent TTE on 11/2020 EF 60%, severe aortic regurgitation. Patient was referred to  for evaluation and management of aortic insufficiency and thoracic aortic dilation with Dr. Vogel. 8/2021 exercise stress echo revealed No significant regional wall abnormalities. Patient referred to Dr. Nichole for further ischemic evaluation pre op.    Patient currently denies chest pain, palpitations, orthopnea or PND.

## 2021-08-23 NOTE — H&P CARDIOLOGY - NSICDXFAMILYHX_GEN_ALL_CORE_FT
FAMILY HISTORY:  Father  Still living? Unknown  Family history of aortic valve disorder, Age at diagnosis: Age Unknown    Aunt  Still living? Unknown  Family history of aortic valve disorder, Age at diagnosis: Age Unknown

## 2021-08-23 NOTE — ASU DISCHARGE PLAN (ADULT/PEDIATRIC) - ASU DC SPECIAL INSTRUCTIONSFT
Wound Care:   the day AFTER your procedure remove bandage GENTLY, and clean using  mild soap and gentle warm, water stream, pat dry. leave OPEN to air. YOU MAY SHOWER   DO NOT apply lotions, creams, ointments, powder, perfumes to your incision site  DO NOT SOAK your site for 1 week ( no baths, no pools, no tubs, etc...)  Check  your groin and /or wrist daily. A small amount of bruising, and soreness are normal    ACTIVITY: for 24 hours   - DO NOT DRIVE  - DO NOT make any important decisions or sign legal documents   - DO NOT operate heavy machineries   - you may resume sexual activity in 48 hours, unless otherwise instructed by your cardiologist     Your procedure was done through the WRIST: for the NEXT 3DAYS:  - avoid pushing, pulling, with that affected wrist   - avoid repeated movement of that hand and wrist ( eg: typing, hammering)  - DO NOT LIFT anything more than 5 lbs     MEDICATION:   take your medications as explained ( see discharge paperwork)   If you received a STENT, you will be taking antiplatelet medications to KEEP YOUR STENT OPEN ( eg: Aspirin, Plavix, Brilinta, Effient, etc).  Take as prescribed DO NOT STOP taking them without consulting with your cardiologist first.     Follow heart healthy diet recommended by your doctor, if you smoke STOP SMOKING ( may call [phone] for center of tobacco control if you need assistance)     CALL your doctor to make appointment in 2 WEEKS     ***CALL YOUR DOCTOR***  if you experience: fever, chills, body aches, or severe pain, swelling, redness, heat or yellow discharge at incision site  If you experience bleeding or excruciating pain at the procedural site, swelling ( golf ball size) at your procedural site  If you experience CHEST PAIN  If you experience extremity numbness, tingling, temperature change ( of your procedural site)   If you are unable to reach your doctor, you may contact:   -Cardiology Office at [facility] at [phone] or   - [phone]

## 2021-08-24 PROBLEM — Q23.1 CONGENITAL INSUFFICIENCY OF AORTIC VALVE: Chronic | Status: ACTIVE | Noted: 2021-08-23

## 2021-08-24 PROBLEM — I51.7 CARDIOMEGALY: Chronic | Status: ACTIVE | Noted: 2021-08-23

## 2021-08-24 PROBLEM — I35.1 NONRHEUMATIC AORTIC (VALVE) INSUFFICIENCY: Chronic | Status: ACTIVE | Noted: 2021-08-23

## 2021-08-24 PROBLEM — D33.3 BENIGN NEOPLASM OF CRANIAL NERVES: Chronic | Status: ACTIVE | Noted: 2021-08-23

## 2021-08-24 PROBLEM — E78.5 HYPERLIPIDEMIA, UNSPECIFIED: Chronic | Status: ACTIVE | Noted: 2021-08-23

## 2021-08-24 PROBLEM — I71.2 THORACIC AORTIC ANEURYSM, WITHOUT RUPTURE: Chronic | Status: ACTIVE | Noted: 2021-08-23

## 2021-08-25 ENCOUNTER — NON-APPOINTMENT (OUTPATIENT)
Age: 51
End: 2021-08-25

## 2021-08-31 ENCOUNTER — OUTPATIENT (OUTPATIENT)
Dept: OUTPATIENT SERVICES | Facility: HOSPITAL | Age: 51
LOS: 1 days | End: 2021-08-31
Payer: COMMERCIAL

## 2021-08-31 VITALS
SYSTOLIC BLOOD PRESSURE: 115 MMHG | RESPIRATION RATE: 16 BRPM | WEIGHT: 175.05 LBS | OXYGEN SATURATION: 97 % | TEMPERATURE: 99 F | HEART RATE: 72 BPM | DIASTOLIC BLOOD PRESSURE: 77 MMHG | HEIGHT: 63 IN

## 2021-08-31 DIAGNOSIS — Z98.890 OTHER SPECIFIED POSTPROCEDURAL STATES: Chronic | ICD-10-CM

## 2021-08-31 DIAGNOSIS — I71.2 THORACIC AORTIC ANEURYSM, WITHOUT RUPTURE: ICD-10-CM

## 2021-08-31 DIAGNOSIS — Z11.52 ENCOUNTER FOR SCREENING FOR COVID-19: ICD-10-CM

## 2021-08-31 LAB
A1C WITH ESTIMATED AVERAGE GLUCOSE RESULT: 5.4 % — SIGNIFICANT CHANGE UP (ref 4–5.6)
ANION GAP SERPL CALC-SCNC: 11 MMOL/L — SIGNIFICANT CHANGE UP (ref 5–17)
BLD GP AB SCN SERPL QL: NEGATIVE — SIGNIFICANT CHANGE UP
BUN SERPL-MCNC: 22 MG/DL — SIGNIFICANT CHANGE UP (ref 7–23)
CALCIUM SERPL-MCNC: 9.5 MG/DL — SIGNIFICANT CHANGE UP (ref 8.4–10.5)
CHLORIDE SERPL-SCNC: 105 MMOL/L — SIGNIFICANT CHANGE UP (ref 96–108)
CO2 SERPL-SCNC: 23 MMOL/L — SIGNIFICANT CHANGE UP (ref 22–31)
CREAT SERPL-MCNC: 0.86 MG/DL — SIGNIFICANT CHANGE UP (ref 0.5–1.3)
ESTIMATED AVERAGE GLUCOSE: 108 MG/DL — SIGNIFICANT CHANGE UP (ref 68–114)
GLUCOSE SERPL-MCNC: 101 MG/DL — HIGH (ref 70–99)
HCT VFR BLD CALC: 42.6 % — SIGNIFICANT CHANGE UP (ref 39–50)
HGB BLD-MCNC: 14.3 G/DL — SIGNIFICANT CHANGE UP (ref 13–17)
MCHC RBC-ENTMCNC: 31.1 PG — SIGNIFICANT CHANGE UP (ref 27–34)
MCHC RBC-ENTMCNC: 33.6 GM/DL — SIGNIFICANT CHANGE UP (ref 32–36)
MCV RBC AUTO: 92.6 FL — SIGNIFICANT CHANGE UP (ref 80–100)
MRSA PCR RESULT.: SIGNIFICANT CHANGE UP
NRBC # BLD: 0 /100 WBCS — SIGNIFICANT CHANGE UP (ref 0–0)
PLATELET # BLD AUTO: 175 K/UL — SIGNIFICANT CHANGE UP (ref 150–400)
POTASSIUM SERPL-MCNC: 4.5 MMOL/L — SIGNIFICANT CHANGE UP (ref 3.5–5.3)
POTASSIUM SERPL-SCNC: 4.5 MMOL/L — SIGNIFICANT CHANGE UP (ref 3.5–5.3)
RBC # BLD: 4.6 M/UL — SIGNIFICANT CHANGE UP (ref 4.2–5.8)
RBC # FLD: 13.5 % — SIGNIFICANT CHANGE UP (ref 10.3–14.5)
RH IG SCN BLD-IMP: POSITIVE — SIGNIFICANT CHANGE UP
S AUREUS DNA NOSE QL NAA+PROBE: SIGNIFICANT CHANGE UP
SARS-COV-2 RNA SPEC QL NAA+PROBE: SIGNIFICANT CHANGE UP
SODIUM SERPL-SCNC: 139 MMOL/L — SIGNIFICANT CHANGE UP (ref 135–145)
WBC # BLD: 5.28 K/UL — SIGNIFICANT CHANGE UP (ref 3.8–10.5)
WBC # FLD AUTO: 5.28 K/UL — SIGNIFICANT CHANGE UP (ref 3.8–10.5)

## 2021-08-31 PROCEDURE — U0003: CPT

## 2021-08-31 PROCEDURE — 87640 STAPH A DNA AMP PROBE: CPT

## 2021-08-31 PROCEDURE — 71046 X-RAY EXAM CHEST 2 VIEWS: CPT | Mod: 26

## 2021-08-31 PROCEDURE — 86900 BLOOD TYPING SEROLOGIC ABO: CPT

## 2021-08-31 PROCEDURE — 86850 RBC ANTIBODY SCREEN: CPT

## 2021-08-31 PROCEDURE — C9803: CPT

## 2021-08-31 PROCEDURE — U0005: CPT

## 2021-08-31 PROCEDURE — 80048 BASIC METABOLIC PNL TOTAL CA: CPT

## 2021-08-31 PROCEDURE — 93880 EXTRACRANIAL BILAT STUDY: CPT

## 2021-08-31 PROCEDURE — 85027 COMPLETE CBC AUTOMATED: CPT

## 2021-08-31 PROCEDURE — 71046 X-RAY EXAM CHEST 2 VIEWS: CPT

## 2021-08-31 PROCEDURE — 93880 EXTRACRANIAL BILAT STUDY: CPT | Mod: 26

## 2021-08-31 PROCEDURE — 87641 MR-STAPH DNA AMP PROBE: CPT

## 2021-08-31 PROCEDURE — 83036 HEMOGLOBIN GLYCOSYLATED A1C: CPT

## 2021-08-31 PROCEDURE — 86901 BLOOD TYPING SEROLOGIC RH(D): CPT

## 2021-08-31 PROCEDURE — G0463: CPT

## 2021-08-31 RX ORDER — FLUOXETINE HCL 10 MG
1 CAPSULE ORAL
Qty: 0 | Refills: 0 | DISCHARGE

## 2021-08-31 RX ORDER — IRON,CARBONYL 45 MG
0 TABLET ORAL
Qty: 0 | Refills: 0 | DISCHARGE

## 2021-08-31 RX ORDER — QUINAPRIL HYDROCHLORIDE 40 MG/1
1 TABLET, FILM COATED ORAL
Qty: 0 | Refills: 0 | DISCHARGE

## 2021-08-31 RX ORDER — ROSUVASTATIN CALCIUM 5 MG/1
1 TABLET ORAL
Qty: 0 | Refills: 0 | DISCHARGE

## 2021-08-31 NOTE — H&P PST ADULT - PROBLEM SELECTOR PLAN 1
scheduled Bio Joe on 9/2/21.  -preop instructions given  -Labs: CBC, BMP< A1c, T&S, MSSA/MRSA PCR done in PST  -DOS : FS, ABO  -B/L Carotid Dop & Chest Xray to be done on 8/31

## 2021-08-31 NOTE — H&P PST ADULT - DOES THIS PATIENT HAVE A HISTORY OF OR HAS BEEN DX WITH HEART FAILURE?
: Nayeli Luu. Myron Toscano MD    REFERRING: Self     Pre-Electrophysiology Diagnosis  1. Typical atrial flutter.     Procedure Performed  1. Electrophysiology testing with right-sided atrial flutter ablation. 2. Left atrial pacing recording from the coronary sinus. 3. 3-D Electroanatomical mapping  4. Transesophageal echo  5. Intracardiac echocardiography    Anesthesia: MAC     Estimated Blood Loss: Less than 10 mL     Specimens: * No specimens in log *    Complications: None    Fluoroscopy Time: 0.3 minutes     Procedure in Detail:  The patient was brought to the electrophysiology lab in the fasting state. A Ref-Star CARTO patch was placed, the patient was then prepped and draped in sterile fashion. A transesophageal echocardiogram was performed prior to the procedure and was negative for an CLAIRE thrombus (see full report in the chart). Venous access was then obtained x4 using modified Seldinger technique under ultrasound guidance, with placement of 3 short sidearm sheaths into the right femoral vein and another 10 Fr sheath in the left femoral vein. A 10 Fr ICE Soundstar catheter (Serometrix-Gonzales) was advanced into the right atrium through the 10 Fr sheath to obtain a 3D electroanatomic map of the right atrium and pertinent anatomy with a focus on minimizing fluoroscopic radiation. One of the 8 Fr sheaths was exchanged for an 8.5 Vizigo sheath (Serometrix-Gonzales) to help with mapping and ablation. A 3.5 mm Biosense Gonzales porous irrigated Celanese [facility] ablation catheter was inserted into one of the 8 Fr sheaths and was used to create a 3D electroanatomical map of the right atrium focusing on the cavotricuspid isthmus and into the coronary sinus. The ablation catheter was used to record intracardiac electrograms along the lateral wall of the right atrium and the His electrogram.  A multipolar catheter was then inserted via an 8 Fr sheath and positioned in the mid coronary sinus.  A Pentary catheter was advanced into the right atrium to map the flutter and it demonstrated that area of slowest conduction was through the CTI, breaking through the middle of the anatomic region (the patient has had a previous CTI ablation). The patient presented to the EP lab in the clinical arrhythmia with pre-procedural concern for a right atrial flutter. After right atrial and coronary sinus electrograms were obtained, it was clear the majority of the cycle length was accounted for with a counterclockwise activation pattern consistent with CTI dependent atrial flutter. Entrainment was performed with proximal CS pacing and lateral TV pacing revealing concealed entrainment with a PPI-TCL of around 30 msec. RF ablation was performed with the use of the Newsbound sheath during the clinical tachycardia. Linear ablation across the cavo-tricuspid isthmus was performed starting with 1:2 A:V EGMs along the isthmus at 6 pm BILL. During delivery of RF, the arrhythmia did not slow or terminate after an extensive line of ablation was performed. The patient was able to have the flutter circuit pace-terminated. While in SR, CS pacing allowed for assessment of the earliest area of breakthrough across the CTI which was noted to be in the middle of the line. Despite no significant pouch or challenging appearing anatomy on ICE, a far lateral line of ablation (along Eustachian ridge) created a change in local EGM sequence consistent with unidirectional block (see figure below). The local electrogram activation sequence, differential pacing maneuvers and electrogram timing was used to demonstrate bidirectional block along the cavotricuspid isthmus with further ablation. Tachycardia cycle length: 300 msec  Local double potential atrial electrograms: 90 msec  Trans-isthmus time post ablation: 180 msec    The coronary sinus multipolar catheter was used to pace the left atrium during the EP study.  The LA CS electrograms were documented and interpreted during the procedure. A comprehensive EP study was performed with 1:1 AV decremental pacing, atrial extrastimuli and ventricular pacing to assess retrograde conduction. The patient did not have sustained slow pathway conduction or evidence of an accessory pathway. Ventricular pacing revealed retrograde VA conduction which was concentric and decremental.    Baseline Intervals  QRS duration: 90 msec  MT interval: 211 msec  RR interval: 951 msec  AH interval: 118 msec  HV interval: 41 msec    EP Study  AV Wenchebach: 480 msec  AV rosy ERP: 600/450 msec  VA Wenchebach: <330 msec    Figure 1. 3D electroanatomic map of the right atrium with associated CTI lesion set. CTI block was achieved with ablation along the very lateral aspects of the CTI. At the end of the case, four VASCADE MVP devices were used to close the four venotomy sites. The MVP tool was advanced into the 8 Fr and 10 Fr sheaths and the seals were deployed. Of note the 8.5 sheath was exchanged for a short 8 Fr sheath. The sheaths were then removed over the MVP devices. The collagen was then deployed and a 30 second dwell time was performed to allow for expansion of the collagen in all 4 sites. The delivery tools were then removed with adequate hemostasis. A post procedure limited ICE image of the RV/LV was performed prior to the patient being transported off the table and was negative for a pericardial effusion although a fat pad was observed. Plan of care: The patient will be placed in the same day discharge protocol, 2-3 hour flat time, followed by ambulation as tolerated and will continue anticoagulation as prescribed pre-procedure. Summary:   1. Successful ablation of clockwise RA flutter. 2. Creation of a line of bidirectional block at the cavotricuspid isthmus. 3.         Comprehensive EP study . 4. Pt tolerated the procedure well. 5. Family updated. Plan: Same-day discharge.   -EP follow up in 1 month.  -Continue current medicines for now. Lory Salazar.  Stephen Oneil MD, [address]  Clinical Cardiac Electrophysiology  [facility]  8/5/2021  4:33 PM no

## 2021-08-31 NOTE — H&P PST ADULT - NSICDXPASTMEDICALHX_GEN_ALL_CORE_FT
PAST MEDICAL HISTORY:  Bicuspid aortic valve     Hearing loss left ear hearing loss due to acoustic neuroma craniotomy 2012    HLD (hyperlipidemia)     Left ventricular dilation     Severe aortic insufficiency     Thoracic aortic aneurysm     Vestibular schwannoma

## 2021-08-31 NOTE — H&P PST ADULT - HISTORY OF PRESENT ILLNESS
51 yr old male with PMH of bicuspid aortic valve as a child, HLD, HTN, vestibular schwannoma, acoustic neuroma s/p removal in 2012 with sensorineural hearing loss in the left ear, aortic insufficiency as a child, and known thoracic aortic dilation for 40 years, s/p Cardiac cath 8/23/21. Patient c/o fatigue for the last 3-4 months. Pt denies c/p, palpitations, or SOB at this time. Pt evaluated by Dr. Vogel for a scheduled Bio Bentall on 9/2/21. Pt denies recent travel or sick contact.     **Covid swab on 8/31/21 at [facility]

## 2021-08-31 NOTE — H&P PST ADULT - NSICDXPASTSURGICALHX_GEN_ALL_CORE_FT
PAST SURGICAL HISTORY:  History of craniotomy excision of acoustic neuroma 4/2012    S/P colonoscopy     S/P endoscopy

## 2021-08-31 NOTE — H&P PST ADULT - ABILITY TO HEAR (WITH HEARING AID OR HEARING APPLIANCE IF NORMALLY USED):
left hearing loss/Mildly to Moderately Impaired: difficulty hearing in some environments or speaker may need to increase volume or speak distinctly

## 2021-09-02 PROBLEM — H91.90 UNSPECIFIED HEARING LOSS, UNSPECIFIED EAR: Chronic | Status: ACTIVE | Noted: 2021-08-23

## 2021-09-12 ENCOUNTER — APPOINTMENT (OUTPATIENT)
Dept: DISASTER EMERGENCY | Facility: CLINIC | Age: 51
End: 2021-09-12

## 2021-09-12 DIAGNOSIS — Z01.818 ENCOUNTER FOR OTHER PREPROCEDURAL EXAMINATION: ICD-10-CM

## 2021-09-13 ENCOUNTER — INPATIENT (INPATIENT)
Facility: HOSPITAL | Age: 51
LOS: 5 days | Discharge: HOME HEALTH | DRG: 219 | End: 2021-09-19
Attending: THORACIC SURGERY (CARDIOTHORACIC VASCULAR SURGERY) | Admitting: THORACIC SURGERY (CARDIOTHORACIC VASCULAR SURGERY)
Payer: COMMERCIAL

## 2021-09-13 ENCOUNTER — RESULT REVIEW (OUTPATIENT)
Age: 51
End: 2021-09-13

## 2021-09-13 ENCOUNTER — APPOINTMENT (OUTPATIENT)
Dept: CARDIOTHORACIC SURGERY | Facility: HOSPITAL | Age: 51
End: 2021-09-13

## 2021-09-13 VITALS
OXYGEN SATURATION: 98 % | HEIGHT: 62.99 IN | DIASTOLIC BLOOD PRESSURE: 73 MMHG | HEART RATE: 62 BPM | SYSTOLIC BLOOD PRESSURE: 133 MMHG | WEIGHT: 172.4 LBS | TEMPERATURE: 98 F | RESPIRATION RATE: 18 BRPM

## 2021-09-13 DIAGNOSIS — Z98.890 OTHER SPECIFIED POSTPROCEDURAL STATES: Chronic | ICD-10-CM

## 2021-09-13 DIAGNOSIS — I71.2 THORACIC AORTIC ANEURYSM, WITHOUT RUPTURE: ICD-10-CM

## 2021-09-13 LAB
ALBUMIN SERPL ELPH-MCNC: 3.7 G/DL — SIGNIFICANT CHANGE UP (ref 3.3–5)
ALP SERPL-CCNC: 54 U/L — SIGNIFICANT CHANGE UP (ref 40–120)
ALT FLD-CCNC: 26 U/L — SIGNIFICANT CHANGE UP (ref 10–45)
ANION GAP SERPL CALC-SCNC: 18 MMOL/L — HIGH (ref 5–17)
APTT BLD: 32.4 SEC — SIGNIFICANT CHANGE UP (ref 27.5–35.5)
AST SERPL-CCNC: 44 U/L — HIGH (ref 10–40)
BASE EXCESS BLDV CALC-SCNC: -1.3 MMOL/L — SIGNIFICANT CHANGE UP (ref -2–2)
BASE EXCESS BLDV CALC-SCNC: -2.1 MMOL/L — LOW (ref -2–2)
BASE EXCESS BLDV CALC-SCNC: -2.1 MMOL/L — LOW (ref -2–2)
BASE EXCESS BLDV CALC-SCNC: -2.7 MMOL/L — LOW (ref -2–2)
BASE EXCESS BLDV CALC-SCNC: -6.5 MMOL/L — LOW (ref -2–2)
BASE EXCESS BLDV CALC-SCNC: 3.4 MMOL/L — HIGH (ref -2–2)
BASOPHILS # BLD AUTO: 0.02 K/UL — SIGNIFICANT CHANGE UP (ref 0–0.2)
BASOPHILS NFR BLD AUTO: 0.2 % — SIGNIFICANT CHANGE UP (ref 0–2)
BILIRUB SERPL-MCNC: 1.7 MG/DL — HIGH (ref 0.2–1.2)
BLOOD GAS VENOUS - CREATININE: SIGNIFICANT CHANGE UP MG/DL (ref 0.5–1.3)
BUN SERPL-MCNC: 11 MG/DL — SIGNIFICANT CHANGE UP (ref 7–23)
CA-I SERPL-SCNC: 0.66 MMOL/L — CRITICAL LOW (ref 1.15–1.33)
CA-I SERPL-SCNC: 0.7 MMOL/L — CRITICAL LOW (ref 1.15–1.33)
CA-I SERPL-SCNC: 0.86 MMOL/L — LOW (ref 1.15–1.33)
CA-I SERPL-SCNC: 0.98 MMOL/L — LOW (ref 1.15–1.33)
CA-I SERPL-SCNC: 0.99 MMOL/L — LOW (ref 1.15–1.33)
CA-I SERPL-SCNC: 1.74 MMOL/L — CRITICAL HIGH (ref 1.15–1.33)
CALCIUM SERPL-MCNC: 8.3 MG/DL — LOW (ref 8.4–10.5)
CHLORIDE BLDV-SCNC: 102 MMOL/L — SIGNIFICANT CHANGE UP (ref 96–108)
CHLORIDE BLDV-SCNC: 102 MMOL/L — SIGNIFICANT CHANGE UP (ref 96–108)
CHLORIDE BLDV-SCNC: 106 MMOL/L — SIGNIFICANT CHANGE UP (ref 96–108)
CHLORIDE BLDV-SCNC: 111 MMOL/L — HIGH (ref 96–108)
CHLORIDE BLDV-SCNC: 112 MMOL/L — HIGH (ref 96–108)
CHLORIDE BLDV-SCNC: 118 MMOL/L — HIGH (ref 96–108)
CHLORIDE SERPL-SCNC: 113 MMOL/L — HIGH (ref 96–108)
CK MB BLD-MCNC: 10.7 % — HIGH (ref 0–3.5)
CK MB CFR SERPL CALC: 55.6 NG/ML — HIGH (ref 0–6.7)
CK SERPL-CCNC: 518 U/L — HIGH (ref 30–200)
CO2 BLDV-SCNC: 21 MMOL/L — LOW (ref 22–26)
CO2 BLDV-SCNC: 25 MMOL/L — SIGNIFICANT CHANGE UP (ref 22–26)
CO2 BLDV-SCNC: 25 MMOL/L — SIGNIFICANT CHANGE UP (ref 22–26)
CO2 BLDV-SCNC: 26 MMOL/L — SIGNIFICANT CHANGE UP (ref 22–26)
CO2 BLDV-SCNC: 27 MMOL/L — HIGH (ref 22–26)
CO2 BLDV-SCNC: 31 MMOL/L — HIGH (ref 22–26)
CO2 SERPL-SCNC: 18 MMOL/L — LOW (ref 22–31)
CREAT SERPL-MCNC: 0.7 MG/DL — SIGNIFICANT CHANGE UP (ref 0.5–1.3)
EOSINOPHIL # BLD AUTO: 0.01 K/UL — SIGNIFICANT CHANGE UP (ref 0–0.5)
EOSINOPHIL NFR BLD AUTO: 0.1 % — SIGNIFICANT CHANGE UP (ref 0–6)
FIBRINOGEN PPP-MCNC: 313 MG/DL — SIGNIFICANT CHANGE UP (ref 290–520)
GAS PNL BLDA: SIGNIFICANT CHANGE UP
GAS PNL BLDV: 136 MMOL/L — SIGNIFICANT CHANGE UP (ref 136–145)
GAS PNL BLDV: 139 MMOL/L — SIGNIFICANT CHANGE UP (ref 136–145)
GAS PNL BLDV: 142 MMOL/L — SIGNIFICANT CHANGE UP (ref 136–145)
GAS PNL BLDV: 142 MMOL/L — SIGNIFICANT CHANGE UP (ref 136–145)
GAS PNL BLDV: SIGNIFICANT CHANGE UP
GLUCOSE BLDV-MCNC: 117 MG/DL — HIGH (ref 70–99)
GLUCOSE BLDV-MCNC: 123 MG/DL — HIGH (ref 70–99)
GLUCOSE BLDV-MCNC: 123 MG/DL — HIGH (ref 70–99)
GLUCOSE BLDV-MCNC: 126 MG/DL — HIGH (ref 70–99)
GLUCOSE BLDV-MCNC: 128 MG/DL — HIGH (ref 70–99)
GLUCOSE BLDV-MCNC: 128 MG/DL — HIGH (ref 70–99)
GLUCOSE SERPL-MCNC: 145 MG/DL — HIGH (ref 70–99)
HCO3 BLDV-SCNC: 20 MMOL/L — LOW (ref 22–29)
HCO3 BLDV-SCNC: 23 MMOL/L — SIGNIFICANT CHANGE UP (ref 22–29)
HCO3 BLDV-SCNC: 24 MMOL/L — SIGNIFICANT CHANGE UP (ref 22–29)
HCO3 BLDV-SCNC: 24 MMOL/L — SIGNIFICANT CHANGE UP (ref 22–29)
HCO3 BLDV-SCNC: 26 MMOL/L — SIGNIFICANT CHANGE UP (ref 22–29)
HCO3 BLDV-SCNC: 30 MMOL/L — HIGH (ref 22–29)
HCT VFR BLD CALC: 33.1 % — LOW (ref 39–50)
HCT VFR BLDA CALC: 31 % — LOW (ref 39–51)
HCT VFR BLDA CALC: 31 % — LOW (ref 39–51)
HCT VFR BLDA CALC: 32 % — LOW (ref 39–51)
HCT VFR BLDA CALC: 32 % — LOW (ref 39–51)
HCT VFR BLDA CALC: 33 % — LOW (ref 39–51)
HCT VFR BLDA CALC: 34 % — LOW (ref 39–51)
HGB BLD CALC-MCNC: 10.3 G/DL — LOW (ref 12.6–17.4)
HGB BLD CALC-MCNC: 10.3 G/DL — LOW (ref 12.6–17.4)
HGB BLD CALC-MCNC: 10.7 G/DL — LOW (ref 12.6–17.4)
HGB BLD CALC-MCNC: 10.8 G/DL — LOW (ref 12.6–17.4)
HGB BLD CALC-MCNC: 10.9 G/DL — LOW (ref 12.6–17.4)
HGB BLD CALC-MCNC: 11.3 G/DL — LOW (ref 12.6–17.4)
HGB BLD-MCNC: 11.2 G/DL — LOW (ref 13–17)
IMM GRANULOCYTES NFR BLD AUTO: 0.6 % — SIGNIFICANT CHANGE UP (ref 0–1.5)
INR BLD: 0.97 RATIO — SIGNIFICANT CHANGE UP (ref 0.88–1.16)
LACTATE BLDV-MCNC: 0.7 MMOL/L — SIGNIFICANT CHANGE UP (ref 0.7–2)
LACTATE BLDV-MCNC: 0.8 MMOL/L — SIGNIFICANT CHANGE UP (ref 0.7–2)
LACTATE BLDV-MCNC: 0.9 MMOL/L — SIGNIFICANT CHANGE UP (ref 0.7–2)
LACTATE BLDV-MCNC: 0.9 MMOL/L — SIGNIFICANT CHANGE UP (ref 0.7–2)
LACTATE BLDV-MCNC: 1 MMOL/L — SIGNIFICANT CHANGE UP (ref 0.7–2)
LACTATE BLDV-MCNC: 1.1 MMOL/L — SIGNIFICANT CHANGE UP (ref 0.7–2)
LYMPHOCYTES # BLD AUTO: 1.5 K/UL — SIGNIFICANT CHANGE UP (ref 1–3.3)
LYMPHOCYTES # BLD AUTO: 13 % — SIGNIFICANT CHANGE UP (ref 13–44)
MAGNESIUM SERPL-MCNC: 2.6 MG/DL — SIGNIFICANT CHANGE UP (ref 1.6–2.6)
MCHC RBC-ENTMCNC: 31.3 PG — SIGNIFICANT CHANGE UP (ref 27–34)
MCHC RBC-ENTMCNC: 33.8 GM/DL — SIGNIFICANT CHANGE UP (ref 32–36)
MCV RBC AUTO: 92.5 FL — SIGNIFICANT CHANGE UP (ref 80–100)
MONOCYTES # BLD AUTO: 0.62 K/UL — SIGNIFICANT CHANGE UP (ref 0–0.9)
MONOCYTES NFR BLD AUTO: 5.4 % — SIGNIFICANT CHANGE UP (ref 2–14)
NEUTROPHILS # BLD AUTO: 9.3 K/UL — HIGH (ref 1.8–7.4)
NEUTROPHILS NFR BLD AUTO: 80.7 % — HIGH (ref 43–77)
NRBC # BLD: 0 /100 WBCS — SIGNIFICANT CHANGE UP (ref 0–0)
OTHER CELLS CSF MANUAL: 14.4 ML/DL — LOW (ref 18–22)
OTHER CELLS CSF MANUAL: 15.1 ML/DL — LOW (ref 18–22)
PCO2 BLDV: 41 MMHG — LOW (ref 42–55)
PCO2 BLDV: 44 MMHG — SIGNIFICANT CHANGE UP (ref 42–55)
PCO2 BLDV: 44 MMHG — SIGNIFICANT CHANGE UP (ref 42–55)
PCO2 BLDV: 47 MMHG — SIGNIFICANT CHANGE UP (ref 42–55)
PCO2 BLDV: 51 MMHG — SIGNIFICANT CHANGE UP (ref 42–55)
PCO2 BLDV: 52 MMHG — SIGNIFICANT CHANGE UP (ref 42–55)
PH BLDV: 7.29 — LOW (ref 7.32–7.43)
PH BLDV: 7.3 — LOW (ref 7.32–7.43)
PH BLDV: 7.32 — SIGNIFICANT CHANGE UP (ref 7.32–7.43)
PH BLDV: 7.33 — SIGNIFICANT CHANGE UP (ref 7.32–7.43)
PH BLDV: 7.34 — SIGNIFICANT CHANGE UP (ref 7.32–7.43)
PH BLDV: 7.37 — SIGNIFICANT CHANGE UP (ref 7.32–7.43)
PHOSPHATE SERPL-MCNC: 6.3 MG/DL — HIGH (ref 2.5–4.5)
PLATELET # BLD AUTO: 111 K/UL — LOW (ref 150–400)
PO2 BLDV: 101 MMHG — HIGH (ref 25–45)
PO2 BLDV: 129 MMHG — HIGH (ref 25–45)
PO2 BLDV: 154 MMHG — HIGH (ref 25–45)
PO2 BLDV: 54 MMHG — HIGH (ref 25–45)
PO2 BLDV: 60 MMHG — HIGH (ref 25–45)
PO2 BLDV: 96 MMHG — HIGH (ref 25–45)
POTASSIUM BLDV-SCNC: 4.6 MMOL/L — SIGNIFICANT CHANGE UP (ref 3.5–5.1)
POTASSIUM BLDV-SCNC: 6.2 MMOL/L — CRITICAL HIGH (ref 3.5–5.1)
POTASSIUM BLDV-SCNC: 6.2 MMOL/L — CRITICAL HIGH (ref 3.5–5.1)
POTASSIUM BLDV-SCNC: 6.3 MMOL/L — CRITICAL HIGH (ref 3.5–5.1)
POTASSIUM BLDV-SCNC: 6.9 MMOL/L — CRITICAL HIGH (ref 3.5–5.1)
POTASSIUM BLDV-SCNC: 6.9 MMOL/L — CRITICAL HIGH (ref 3.5–5.1)
POTASSIUM SERPL-MCNC: 4.3 MMOL/L — SIGNIFICANT CHANGE UP (ref 3.5–5.3)
POTASSIUM SERPL-SCNC: 4.3 MMOL/L — SIGNIFICANT CHANGE UP (ref 3.5–5.3)
PROT SERPL-MCNC: 5 G/DL — LOW (ref 6–8.3)
PROTHROM AB SERPL-ACNC: 11.7 SEC — SIGNIFICANT CHANGE UP (ref 10.6–13.6)
RBC # BLD: 3.58 M/UL — LOW (ref 4.2–5.8)
RBC # FLD: 13.2 % — SIGNIFICANT CHANGE UP (ref 10.3–14.5)
SAO2 % BLDV: 90.2 % — HIGH (ref 67–88)
SAO2 % BLDV: 92.3 % — HIGH (ref 67–88)
SAO2 % BLDV: 98.7 % — HIGH (ref 67–88)
SAO2 % BLDV: 98.9 % — HIGH (ref 67–88)
SAO2 % BLDV: 99.7 % — HIGH (ref 67–88)
SAO2 % BLDV: 99.9 % — HIGH (ref 67–88)
SARS-COV-2 N GENE NPH QL NAA+PROBE: NOT DETECTED
SARS-COV-2 RNA SPEC QL NAA+PROBE: SIGNIFICANT CHANGE UP
SODIUM SERPL-SCNC: 149 MMOL/L — HIGH (ref 135–145)
TROPONIN T, HIGH SENSITIVITY RESULT: 866 NG/L — HIGH (ref 0–51)
WBC # BLD: 11.52 K/UL — HIGH (ref 3.8–10.5)
WBC # FLD AUTO: 11.52 K/UL — HIGH (ref 3.8–10.5)

## 2021-09-13 PROCEDURE — 88304 TISSUE EXAM BY PATHOLOGIST: CPT | Mod: 26

## 2021-09-13 PROCEDURE — 88305 TISSUE EXAM BY PATHOLOGIST: CPT | Mod: 26

## 2021-09-13 PROCEDURE — 93010 ELECTROCARDIOGRAM REPORT: CPT

## 2021-09-13 PROCEDURE — 33405 REPLACEMENT AORTIC VALVE OPN: CPT

## 2021-09-13 PROCEDURE — 99291 CRITICAL CARE FIRST HOUR: CPT

## 2021-09-13 PROCEDURE — 71045 X-RAY EXAM CHEST 1 VIEW: CPT | Mod: 26

## 2021-09-13 PROCEDURE — 33859 AS-AORT GRF F/DS OTH/THN DSJ: CPT

## 2021-09-13 RX ORDER — CEFUROXIME AXETIL 250 MG
1500 TABLET ORAL EVERY 8 HOURS
Refills: 0 | Status: COMPLETED | OUTPATIENT
Start: 2021-09-13 | End: 2021-09-14

## 2021-09-13 RX ORDER — OXYCODONE AND ACETAMINOPHEN 5; 325 MG/1; MG/1
1 TABLET ORAL EVERY 4 HOURS
Refills: 0 | Status: DISCONTINUED | OUTPATIENT
Start: 2021-09-13 | End: 2021-09-19

## 2021-09-13 RX ORDER — POLYETHYLENE GLYCOL 3350 17 G/17G
17 POWDER, FOR SOLUTION ORAL DAILY
Refills: 0 | Status: DISCONTINUED | OUTPATIENT
Start: 2021-09-13 | End: 2021-09-19

## 2021-09-13 RX ORDER — METOCLOPRAMIDE HCL 10 MG
10 TABLET ORAL EVERY 8 HOURS
Refills: 0 | Status: COMPLETED | OUTPATIENT
Start: 2021-09-13 | End: 2021-09-14

## 2021-09-13 RX ORDER — SODIUM CHLORIDE 9 MG/ML
1000 INJECTION INTRAMUSCULAR; INTRAVENOUS; SUBCUTANEOUS
Refills: 0 | Status: DISCONTINUED | OUTPATIENT
Start: 2021-09-13 | End: 2021-09-19

## 2021-09-13 RX ORDER — DEXTROSE 50 % IN WATER 50 %
25 SYRINGE (ML) INTRAVENOUS
Refills: 0 | Status: DISCONTINUED | OUTPATIENT
Start: 2021-09-13 | End: 2021-09-19

## 2021-09-13 RX ORDER — POTASSIUM CHLORIDE 20 MEQ
10 PACKET (EA) ORAL
Refills: 0 | Status: DISCONTINUED | OUTPATIENT
Start: 2021-09-13 | End: 2021-09-14

## 2021-09-13 RX ORDER — ACETAMINOPHEN 500 MG
1000 TABLET ORAL ONCE
Refills: 0 | Status: COMPLETED | OUTPATIENT
Start: 2021-09-13 | End: 2021-09-13

## 2021-09-13 RX ORDER — CHLORHEXIDINE GLUCONATE 213 G/1000ML
15 SOLUTION TOPICAL EVERY 12 HOURS
Refills: 0 | Status: DISCONTINUED | OUTPATIENT
Start: 2021-09-13 | End: 2021-09-13

## 2021-09-13 RX ORDER — NICARDIPINE HYDROCHLORIDE 30 MG/1
5 CAPSULE, EXTENDED RELEASE ORAL
Qty: 40 | Refills: 0 | Status: DISCONTINUED | OUTPATIENT
Start: 2021-09-13 | End: 2021-09-14

## 2021-09-13 RX ORDER — MEPERIDINE HYDROCHLORIDE 50 MG/ML
25 INJECTION INTRAMUSCULAR; INTRAVENOUS; SUBCUTANEOUS ONCE
Refills: 0 | Status: COMPLETED | OUTPATIENT
Start: 2021-09-13

## 2021-09-13 RX ORDER — HYDROMORPHONE HYDROCHLORIDE 2 MG/ML
0.5 INJECTION INTRAMUSCULAR; INTRAVENOUS; SUBCUTANEOUS ONCE
Refills: 0 | Status: DISCONTINUED | OUTPATIENT
Start: 2021-09-13 | End: 2021-09-13

## 2021-09-13 RX ORDER — CHLORHEXIDINE GLUCONATE 213 G/1000ML
5 SOLUTION TOPICAL
Refills: 0 | Status: DISCONTINUED | OUTPATIENT
Start: 2021-09-13 | End: 2021-09-14

## 2021-09-13 RX ORDER — INSULIN HUMAN 100 [IU]/ML
3 INJECTION, SOLUTION SUBCUTANEOUS
Qty: 100 | Refills: 0 | Status: DISCONTINUED | OUTPATIENT
Start: 2021-09-13 | End: 2021-09-14

## 2021-09-13 RX ORDER — SODIUM CHLORIDE 9 MG/ML
1000 INJECTION, SOLUTION INTRAVENOUS ONCE
Refills: 0 | Status: COMPLETED | OUTPATIENT
Start: 2021-09-13 | End: 2021-09-13

## 2021-09-13 RX ORDER — DOBUTAMINE HCL 250MG/20ML
2 VIAL (ML) INTRAVENOUS
Qty: 500 | Refills: 0 | Status: DISCONTINUED | OUTPATIENT
Start: 2021-09-13 | End: 2021-09-14

## 2021-09-13 RX ORDER — PANTOPRAZOLE SODIUM 20 MG/1
40 TABLET, DELAYED RELEASE ORAL DAILY
Refills: 0 | Status: DISCONTINUED | OUTPATIENT
Start: 2021-09-13 | End: 2021-09-14

## 2021-09-13 RX ORDER — KETOROLAC TROMETHAMINE 30 MG/ML
30 SYRINGE (ML) INJECTION EVERY 8 HOURS
Refills: 0 | Status: DISCONTINUED | OUTPATIENT
Start: 2021-09-13 | End: 2021-09-15

## 2021-09-13 RX ORDER — DEXMEDETOMIDINE HYDROCHLORIDE IN 0.9% SODIUM CHLORIDE 4 UG/ML
1.5 INJECTION INTRAVENOUS
Qty: 200 | Refills: 0 | Status: DISCONTINUED | OUTPATIENT
Start: 2021-09-13 | End: 2021-09-14

## 2021-09-13 RX ORDER — DEXTROSE 50 % IN WATER 50 %
50 SYRINGE (ML) INTRAVENOUS
Refills: 0 | Status: DISCONTINUED | OUTPATIENT
Start: 2021-09-13 | End: 2021-09-19

## 2021-09-13 RX ORDER — OXYCODONE AND ACETAMINOPHEN 5; 325 MG/1; MG/1
2 TABLET ORAL EVERY 6 HOURS
Refills: 0 | Status: DISCONTINUED | OUTPATIENT
Start: 2021-09-13 | End: 2021-09-19

## 2021-09-13 RX ORDER — MEPERIDINE HYDROCHLORIDE 50 MG/ML
25 INJECTION INTRAMUSCULAR; INTRAVENOUS; SUBCUTANEOUS ONCE
Refills: 0 | Status: DISCONTINUED | OUTPATIENT
Start: 2021-09-13 | End: 2021-09-13

## 2021-09-13 RX ADMIN — Medication 1000 MILLIGRAM(S): at 20:45

## 2021-09-13 RX ADMIN — HYDROMORPHONE HYDROCHLORIDE 0.5 MILLIGRAM(S): 2 INJECTION INTRAMUSCULAR; INTRAVENOUS; SUBCUTANEOUS at 16:16

## 2021-09-13 RX ADMIN — MEPERIDINE HYDROCHLORIDE 25 MILLIGRAM(S): 50 INJECTION INTRAMUSCULAR; INTRAVENOUS; SUBCUTANEOUS at 15:24

## 2021-09-13 RX ADMIN — HYDROMORPHONE HYDROCHLORIDE 0.5 MILLIGRAM(S): 2 INJECTION INTRAMUSCULAR; INTRAVENOUS; SUBCUTANEOUS at 16:30

## 2021-09-13 RX ADMIN — MEPERIDINE HYDROCHLORIDE 25 MILLIGRAM(S): 50 INJECTION INTRAMUSCULAR; INTRAVENOUS; SUBCUTANEOUS at 15:02

## 2021-09-13 RX ADMIN — Medication 10 MILLIGRAM(S): at 21:25

## 2021-09-13 RX ADMIN — SODIUM CHLORIDE 10 MILLILITER(S): 9 INJECTION INTRAMUSCULAR; INTRAVENOUS; SUBCUTANEOUS at 20:15

## 2021-09-13 RX ADMIN — CHLORHEXIDINE GLUCONATE 15 MILLILITER(S): 213 SOLUTION TOPICAL at 17:18

## 2021-09-13 RX ADMIN — Medication 100 MILLIGRAM(S): at 16:31

## 2021-09-13 RX ADMIN — Medication 400 MILLIGRAM(S): at 20:23

## 2021-09-13 RX ADMIN — CHLORHEXIDINE GLUCONATE 5 MILLILITER(S): 213 SOLUTION TOPICAL at 17:46

## 2021-09-13 RX ADMIN — Medication 100 MILLIGRAM(S): at 21:24

## 2021-09-13 RX ADMIN — Medication 10 MILLIGRAM(S): at 15:56

## 2021-09-13 RX ADMIN — SODIUM CHLORIDE 3000 MILLILITER(S): 9 INJECTION, SOLUTION INTRAVENOUS at 16:45

## 2021-09-13 RX ADMIN — Medication 30 MILLIGRAM(S): at 21:26

## 2021-09-13 RX ADMIN — PANTOPRAZOLE SODIUM 40 MILLIGRAM(S): 20 TABLET, DELAYED RELEASE ORAL at 15:56

## 2021-09-13 RX ADMIN — Medication 4.69 MICROGRAM(S)/KG/MIN: at 20:16

## 2021-09-13 NOTE — PROGRESS NOTE ADULT - SUBJECTIVE AND OBJECTIVE BOX
IRVIN AGUIRRE  MRN-[id number]  Patient is a 51y old  Male who presents with a chief complaint of "I'm having heart surgery" (31 Aug 2021 08:35)    HPI:  51 year old male with significant PMHx of bicuspid aortic valve as a child, HLD, HTN, vestibular schwannoma, acoustic neuroma s/p removal in 2012 with sensorineural hearing loss in the left ear, aortic insufficiency as a child, and known thoracic aortic dilation for 40 years presents for [facility] pre ascending aorta replacement and bio prosthetic aortic valve replacement planned on 9/2/2021. Patient with c/o fatigue for the last 3-4 months. Patient was initially monitored by pediatric cardiologist (Dr. German dutta) and then by Dr. Robbins for 40 years with TTE annually. Recent TTE on 11/2020 EF 60%, severe aortic regurgitation. Patient was referred to  for evaluation and management of aortic insufficiency and thoracic aortic dilation with Dr. Vogel. 8/2021 exercise stress echo revealed No significant regional wall abnormalities. Patient referred to Dr. Nichole for further ischemic evaluation pre op.    Patient currently denies chest pain, palpitations, orthopnea or PND.  (31 Aug 2021 08:35)      Surgery/Hospital course:  09/13/2021 AVR(t), ascending tube graft     Today:    REVIEW OF SYSTEMS:  Unable to obtain, due to patient being intubated     Physical Exam:  Vital Signs Last 24 Hrs  T(C): 35.8 (13 Sep 2021 15:00), Max: 36.6 (13 Sep 2021 07:26)  T(F): 96.4 (13 Sep 2021 15:00), Max: 97.9 (13 Sep 2021 07:26)  HR: 97 (13 Sep 2021 17:07) (62 - 102)  BP: 133/73 (13 Sep 2021 07:26) (133/73 - 133/73)  BP(mean): --  RR: 27 (13 Sep 2021 17:00) (0 - 27)  SpO2: 100% (13 Sep 2021 17:07) (98% - 100%)  Gen:  sedated   CNS: intubated   Neck: no JVD  RES : clear , no wheezing    Chest:   + chest tube                    CVS: Regular  rhythm. Normal S1/S2  Abd: Soft, non-distended. Bowel sounds present.  Skin: No rash.  Ext:  no edema, A Line  ============================I/O===========================   I&O's Detail    13 Sep 2021 07:01  -  13 Sep 2021 17:38  --------------------------------------------------------  IN:    Dexmedetomidine: 28.1 mL    DOBUTamine: 18 mL    Lactated Ringers Bolus: 500 mL    NiCARdipine: 34 mL    sodium chloride 0.9%: 30 mL  Total IN: 610.1 mL    OUT:    Bulb (mL): 60 mL    Chest Tube (mL): 50 mL    Indwelling Catheter - Urethral (mL): 700 mL    Insulin: 0 mL  Total OUT: 810 mL    Total NET: -199.9 mL        ============================ LABS =========================                        11.2   11.52 )-----------( 111      ( 13 Sep 2021 14:45 )             33.1     09-13    149<H>  |  113<H>  |  11  ----------------------------<  145<H>  4.3   |  18<L>  |  0.70    Ca    8.3<L>      13 Sep 2021 14:45  Phos  6.3     09-13  Mg     2.6     09-13    TPro  5.0<L>  /  Alb  3.7  /  TBili  1.7<H>  /  DBili  x   /  AST  44<H>  /  ALT  26  /  AlkPhos  54  09-13    LIVER FUNCTIONS - ( 13 Sep 2021 14:45 )  Alb: 3.7 g/dL / Pro: 5.0 g/dL / ALK PHOS: 54 U/L / ALT: 26 U/L / AST: 44 U/L / GGT: x           PT/INR - ( 13 Sep 2021 14:44 )   PT: 11.7 sec;   INR: 0.97 ratio         PTT - ( 13 Sep 2021 14:44 )  PTT:32.4 sec  ABG - ( 13 Sep 2021 17:20 )  pH, Arterial: 7.36  pH, Blood: x     /  pCO2: 43    /  pO2: 184   / HCO3: 24    / Base Excess: -1.2  /  SaO2: 100.0               ======================Micro/Rad/Cardio=================  CXR: Reviewed  Echo:Reviewed  ======================================================  PAST MEDICAL & SURGICAL HISTORY:  Bicuspid aortic valve    HLD (hyperlipidemia)    Hearing loss  left ear hearing loss due to acoustic neuroma craniotomy    Left ventricular dilation    Severe aortic insufficiency    Thoracic aortic aneurysm    Vestibular schwannoma    History of craniotomy  excision of acoustic neuroma 4/2012    S/P colonoscopy    S/P endoscopy      ====================ASSESSMENT ==============  Aortic valve regurgitation s/p AVR(t), ascending tube graft on 9/13   Encounter for ventilator management   Hemodynamic instability   Hypertension   Acute blood loss anemia   Thrombocytopenia   Stress hyperglycemia   HLD (hyperlipidemia)      Plan:  ====================== NEUROLOGY=====================  Sedated with IV Precedex to maintain vent synchrony   Percocet PRN for analgesia     dexMEDEtomidine Infusion 1.5 MICROgram(s)/kG/Hr (29.3 mL/Hr) IV Continuous <Continuous>  oxycodone    5 mG/acetaminophen 325 mG 1 Tablet(s) Oral every 4 hours PRN Mild Pain (1 - 3)  oxycodone    5 mG/acetaminophen 325 mG 2 Tablet(s) Oral every 6 hours PRN Moderate Pain (4 - 6)    ==================== RESPIRATORY======================  Respiratory status required full ventilatory support, close monitoring of respiratory rate and breathing pattern, the following of ABG’s with A-line monitoring, continuous pulse oximetry monitoring   CPAP vent weaning as tolerated     Mechanical Ventilation:  Mode: CPAP with PS  FiO2: 50  PEEP: 5  PS: 15  MAP: 9  PIP: 21      ====================CARDIOVASCULAR==================  Aortic valve regurgitation s/p AVR(t), ascending tube graft on 9/13   Continue invasive hemodynamic monitoring   Inotropic support with IV Dobutamine   Blood pressure support with IV Cardene   Back up pacing wires in place     DOBUTamine Infusion 2 MICROgram(s)/kG/Min (4.69 mL/Hr) IV Continuous <Continuous>  niCARdipine Infusion 5 mG/Hr (25 mL/Hr) IV Continuous <Continuous>    ===================HEMATOLOGIC/ONC ===================  Thrombocytopenia and acute blood loss anemia, monitor H&H/Plts     ===================== RENAL =========================  Stein for monitoring urine output  Continue to monitor I/Os, BUN/Creatinine   Replete lytes PRN. Keep K> 4 and Mg >2    ==================== GASTROINTESTINAL===================  NPO post operatively, will advance diet as tolerated   Bowel regimen with Miralax   Reglan for gut motility     GI prophylaxis, pantoprazole  Injectable 40 milliGRAM(s) IV Push daily  polyethylene glycol 3350 17 Gram(s) Oral daily  metoclopramide Injectable 10 milliGRAM(s) IV Push every 8 hours    =======================    ENDOCRINE  =====================  Stress hyperglycemia, continue glucose control with IV insulin drip     insulin regular Infusion 3 Unit(s)/Hr (3 mL/Hr) IV Continuous <Continuous>    ========================INFECTIOUS DISEASE================  Afebrile, WBC 11.52   Continue trending WBC and monitoring fever curve  Perioperative coverage with Cefuroxime     cefuroxime  IVPB 1500 milliGRAM(s) IV Intermittent every 8 hours      Patient requires continuous monitoring with:  bedside rhythm monitoring, arterial line, pulse oximetry, ventilator management and monitoring; intermittent blood gas analysis.  Care plan discussed with ICU care team.  patient remain critical; required more than usual post op care; I have spent 35 minutes providing non routine post op care, revaluated multiple times during the day       By signing my name below, I, Evelia Lancaster, attest that this documentation has been prepared under the direction and in the presence of Porfirio Ann MD.  Electronically signed: Christi Delgado, 09-13-21 @ 17:38    I, Porfirio Ann, personally performed the services described in this documentation. all medical record entries made by the christi were at my direction and in my presence. I have reviewed the chart and agree that the record reflects my personal performance and is accurate and complete  Electronically signed: Porfirio Ann, 09-13-21 @ 17:38       IRVIN AGUIRRE  MRN-[id number]  Patient is a 51y old  Male who presents with a chief complaint of "I'm having heart surgery" (31 Aug 2021 08:35)    HPI:  51 year old male with significant PMHx of bicuspid aortic valve as a child, HLD, HTN, vestibular schwannoma, acoustic neuroma s/p removal in 2012 with sensorineural hearing loss in the left ear, aortic insufficiency as a child, and known thoracic aortic dilation for 40 years presents for [facility] pre ascending aorta replacement and bio prosthetic aortic valve replacement planned on 9/2/2021. Patient with c/o fatigue for the last 3-4 months. Patient was initially monitored by pediatric cardiologist (Dr. German dutta) and then by Dr. Robbins for 40 years with TTE annually. Recent TTE on 11/2020 EF 60%, severe aortic regurgitation. Patient was referred to  for evaluation and management of aortic insufficiency and thoracic aortic dilation with Dr. Vogel. 8/2021 exercise stress echo revealed No significant regional wall abnormalities. Patient referred to Dr. Nichole for further ischemic evaluation pre op.    Patient currently denies chest pain, palpitations, orthopnea or PND.  (31 Aug 2021 08:35)      Surgery/Hospital course:  09/13/2021 AVR(t), ascending tube graft     Today:  Blood pressure support with IV Cardene for hypertension weaned to off post operatively. Maintain inotropic support with IV Dobutamine. Lactate peaked to 2.2, now downtrending to 1.7, will continue to monitor closely.     REVIEW OF SYSTEMS:  Unable to obtain, due to patient being intubated     Physical Exam:  Vital Signs Last 24 Hrs  T(C): 35.8 (13 Sep 2021 15:00), Max: 36.6 (13 Sep 2021 07:26)  T(F): 96.4 (13 Sep 2021 15:00), Max: 97.9 (13 Sep 2021 07:26)  HR: 97 (13 Sep 2021 17:07) (62 - 102)  BP: 133/73 (13 Sep 2021 07:26) (133/73 - 133/73)  BP(mean): --  RR: 27 (13 Sep 2021 17:00) (0 - 27)  SpO2: 100% (13 Sep 2021 17:07) (98% - 100%)  Gen:  sedated   CNS: intubated   Neck: no JVD  RES : clear , no wheezing    Chest:   + chest tube                    CVS: Regular  rhythm. Normal S1/S2  Abd: Soft, non-distended. Bowel sounds present.  Skin: No rash.  Ext:  no edema, A Line  ============================I/O===========================   I&O's Detail    13 Sep 2021 07:01  -  13 Sep 2021 17:38  --------------------------------------------------------  IN:    Dexmedetomidine: 28.1 mL    DOBUTamine: 18 mL    Lactated Ringers Bolus: 500 mL    NiCARdipine: 34 mL    sodium chloride 0.9%: 30 mL  Total IN: 610.1 mL    OUT:    Bulb (mL): 60 mL    Chest Tube (mL): 50 mL    Indwelling Catheter - Urethral (mL): 700 mL    Insulin: 0 mL  Total OUT: 810 mL    Total NET: -199.9 mL        ============================ LABS =========================                        11.2   11.52 )-----------( 111      ( 13 Sep 2021 14:45 )             33.1     09-13    149<H>  |  113<H>  |  11  ----------------------------<  145<H>  4.3   |  18<L>  |  0.70    Ca    8.3<L>      13 Sep 2021 14:45  Phos  6.3     09-13  Mg     2.6     09-13    TPro  5.0<L>  /  Alb  3.7  /  TBili  1.7<H>  /  DBili  x   /  AST  44<H>  /  ALT  26  /  AlkPhos  54  09-13    LIVER FUNCTIONS - ( 13 Sep 2021 14:45 )  Alb: 3.7 g/dL / Pro: 5.0 g/dL / ALK PHOS: 54 U/L / ALT: 26 U/L / AST: 44 U/L / GGT: x           PT/INR - ( 13 Sep 2021 14:44 )   PT: 11.7 sec;   INR: 0.97 ratio         PTT - ( 13 Sep 2021 14:44 )  PTT:32.4 sec  ABG - ( 13 Sep 2021 17:20 )  pH, Arterial: 7.36  pH, Blood: x     /  pCO2: 43    /  pO2: 184   / HCO3: 24    / Base Excess: -1.2  /  SaO2: 100.0               ======================Micro/Rad/Cardio=================  CXR: Reviewed  Echo:Reviewed  ======================================================  PAST MEDICAL & SURGICAL HISTORY:  Bicuspid aortic valve    HLD (hyperlipidemia)    Hearing loss  left ear hearing loss due to acoustic neuroma craniotomy    Left ventricular dilation    Severe aortic insufficiency    Thoracic aortic aneurysm    Vestibular schwannoma    History of craniotomy  excision of acoustic neuroma 4/2012    S/P colonoscopy    S/P endoscopy      ====================ASSESSMENT ==============  Aortic valve regurgitation s/p AVR(t), ascending tube graft on 9/13   Encounter for ventilator management   Hemodynamic instability   Hypertension   Acute blood loss anemia   Thrombocytopenia   Stress hyperglycemia   HLD (hyperlipidemia)      Plan:  ====================== NEUROLOGY=====================  Sedated with IV Precedex to maintain vent synchrony   Percocet PRN for analgesia     dexMEDEtomidine Infusion 1.5 MICROgram(s)/kG/Hr (29.3 mL/Hr) IV Continuous <Continuous>  oxycodone    5 mG/acetaminophen 325 mG 1 Tablet(s) Oral every 4 hours PRN Mild Pain (1 - 3)  oxycodone    5 mG/acetaminophen 325 mG 2 Tablet(s) Oral every 6 hours PRN Moderate Pain (4 - 6)    ==================== RESPIRATORY======================  Respiratory status required full ventilatory support, close monitoring of respiratory rate and breathing pattern, the following of ABG’s with A-line monitoring, continuous pulse oximetry monitoring   CPAP vent weaning as tolerated     Mechanical Ventilation:  Mode: CPAP with PS  FiO2: 50  PEEP: 5  PS: 15  MAP: 9  PIP: 21      ====================CARDIOVASCULAR==================  Aortic valve regurgitation s/p AVR(t), ascending tube graft on 9/13   Continue invasive hemodynamic monitoring   Lactate peaked to 2.2, now downtrending to 1.7, will continue to monitor closely   Blood pressure support with IV Cardene for hypertension weaned to off post operatively  Maintain inotropic support with IV Dobutamine  Back up pacing wires in place     DOBUTamine Infusion 2 MICROgram(s)/kG/Min (4.69 mL/Hr) IV Continuous <Continuous>  niCARdipine Infusion 5 mG/Hr (25 mL/Hr) IV Continuous <Continuous>    ===================HEMATOLOGIC/ONC ===================  Thrombocytopenia and acute blood loss anemia, monitor H&H/Plts     ===================== RENAL =========================  Stein for monitoring urine output  Continue to monitor I/Os, BUN/Creatinine   Replete lytes PRN. Keep K> 4 and Mg >2    ==================== GASTROINTESTINAL===================  NPO post operatively, will advance diet as tolerated   Bowel regimen with Miralax   Reglan for gut motility     GI prophylaxis, pantoprazole  Injectable 40 milliGRAM(s) IV Push daily  polyethylene glycol 3350 17 Gram(s) Oral daily  metoclopramide Injectable 10 milliGRAM(s) IV Push every 8 hours    =======================    ENDOCRINE  =====================  Stress hyperglycemia, continue glucose control with IV insulin drip     insulin regular Infusion 3 Unit(s)/Hr (3 mL/Hr) IV Continuous <Continuous>    ========================INFECTIOUS DISEASE================  Afebrile, WBC 11.52   Continue trending WBC and monitoring fever curve  Perioperative coverage with Cefuroxime     cefuroxime  IVPB 1500 milliGRAM(s) IV Intermittent every 8 hours      Patient requires continuous monitoring with:  bedside rhythm monitoring, arterial line, pulse oximetry, ventilator management and monitoring; intermittent blood gas analysis.  Care plan discussed with ICU care team.  patient remain critical; required more than usual post op care; I have spent 35 minutes providing non routine post op care, revaluated multiple times during the day       By signing my name below, I, Evelia Lancaster, attest that this documentation has been prepared under the direction and in the presence of Porfirio Ann MD.  Electronically signed: Christi Delgado, 09-13-21 @ 17:38    I, Porfirio Ann, personally performed the services described in this documentation. all medical record entries made by the christi were at my direction and in my presence. I have reviewed the chart and agree that the record reflects my personal performance and is accurate and complete  Electronically signed: Porfirio Ann, 09-13-21 @ 17:38       IRVIN AGUIRRE  MRN-[id number]  Patient is a 51y old  Male who presents with a chief complaint of "I'm having heart surgery" (31 Aug 2021 08:35)    HPI:  51 year old male with significant PMHx of bicuspid aortic valve as a child, HLD, HTN, vestibular schwannoma, acoustic neuroma s/p removal in 2012 with sensorineural hearing loss in the left ear, aortic insufficiency as a child, and known thoracic aortic dilation for 40 years presents for [facility] pre ascending aorta replacement and bio prosthetic aortic valve replacement planned on 9/2/2021. Patient with c/o fatigue for the last 3-4 months. Patient was initially monitored by pediatric cardiologist (Dr. German dutta) and then by Dr. Robbins for 40 years with TTE annually. Recent TTE on 11/2020 EF 60%, severe aortic regurgitation. Patient was referred to  for evaluation and management of aortic insufficiency and thoracic aortic dilation with Dr. Vogel. 8/2021 exercise stress echo revealed No significant regional wall abnormalities. Patient referred to Dr. Nichole for further ischemic evaluation pre op.    Patient currently denies chest pain, palpitations, orthopnea or PND.  (31 Aug 2021 08:35)      Surgery/Hospital course:  09/13/2021 AVR(t), ascending tube graft     Today:  Blood pressure support with IV Cardene for hypertension weaned to off post operatively. Maintain inotropic support with IV Dobutamine. Lactate peaked to 2.2, now downtrending to 1.7, will continue to monitor closely. Full vent support subsequently weaned to pressure support, patient extubated this PM to Aerosol Mask.     REVIEW OF SYSTEMS:  Gen: No fever  EYES/ENT: No visual changes;  No vertigo or throat pain   NECK: No pain   RES:  No shortness of breath or Cough  CARD: No chest pain   +incisional pain   GI: No abdominal pain  : No dysuria  NEURO: No weakness  SKIN: No itching, rashes     Physical Exam:  Vital Signs Last 24 Hrs  T(C): 35.8 (13 Sep 2021 15:00), Max: 36.6 (13 Sep 2021 07:26)  T(F): 96.4 (13 Sep 2021 15:00), Max: 97.9 (13 Sep 2021 07:26)  HR: 97 (13 Sep 2021 17:07) (62 - 102)  BP: 133/73 (13 Sep 2021 07:26) (133/73 - 133/73)  BP(mean): --  RR: 27 (13 Sep 2021 17:00) (0 - 27)  SpO2: 100% (13 Sep 2021 17:07) (98% - 100%)  Gen:  NAD  CNS: post anesthetic   Neck: no JVD  RES : clear , no wheezing    Chest:   + chest tube                    CVS: Regular  rhythm. Normal S1/S2  Abd: Soft, non-distended. Bowel sounds present.  Skin: No rash.  Ext:  no edema, A Line  ============================I/O===========================   I&O's Detail    13 Sep 2021 07:01  -  13 Sep 2021 17:38  --------------------------------------------------------  IN:    Dexmedetomidine: 28.1 mL    DOBUTamine: 18 mL    Lactated Ringers Bolus: 500 mL    NiCARdipine: 34 mL    sodium chloride 0.9%: 30 mL  Total IN: 610.1 mL    OUT:    Bulb (mL): 60 mL    Chest Tube (mL): 50 mL    Indwelling Catheter - Urethral (mL): 700 mL    Insulin: 0 mL  Total OUT: 810 mL    Total NET: -199.9 mL        ============================ LABS =========================                        11.2   11.52 )-----------( 111      ( 13 Sep 2021 14:45 )             33.1     09-13    149<H>  |  113<H>  |  11  ----------------------------<  145<H>  4.3   |  18<L>  |  0.70    Ca    8.3<L>      13 Sep 2021 14:45  Phos  6.3     09-13  Mg     2.6     09-13    TPro  5.0<L>  /  Alb  3.7  /  TBili  1.7<H>  /  DBili  x   /  AST  44<H>  /  ALT  26  /  AlkPhos  54  09-13    LIVER FUNCTIONS - ( 13 Sep 2021 14:45 )  Alb: 3.7 g/dL / Pro: 5.0 g/dL / ALK PHOS: 54 U/L / ALT: 26 U/L / AST: 44 U/L / GGT: x           PT/INR - ( 13 Sep 2021 14:44 )   PT: 11.7 sec;   INR: 0.97 ratio         PTT - ( 13 Sep 2021 14:44 )  PTT:32.4 sec  ABG - ( 13 Sep 2021 17:20 )  pH, Arterial: 7.36  pH, Blood: x     /  pCO2: 43    /  pO2: 184   / HCO3: 24    / Base Excess: -1.2  /  SaO2: 100.0               ======================Micro/Rad/Cardio=================  CXR: Reviewed  Echo:Reviewed  ======================================================  PAST MEDICAL & SURGICAL HISTORY:  Bicuspid aortic valve    HLD (hyperlipidemia)    Hearing loss  left ear hearing loss due to acoustic neuroma craniotomy    Left ventricular dilation    Severe aortic insufficiency    Thoracic aortic aneurysm    Vestibular schwannoma    History of craniotomy  excision of acoustic neuroma 4/2012    S/P colonoscopy    S/P endoscopy      ====================ASSESSMENT ==============  Aortic valve regurgitation s/p AVR(t), ascending tube graft on 9/13   Encounter for ventilator management   Hemodynamic instability   Hypertension   Acute blood loss anemia   Thrombocytopenia   Stress hyperglycemia   HLD (hyperlipidemia)      Plan:  ====================== NEUROLOGY=====================  Continue close monitoring of neuro status   Percocet PRN for analgesia     oxycodone    5 mG/acetaminophen 325 mG 1 Tablet(s) Oral every 4 hours PRN Mild Pain (1 - 3)  oxycodone    5 mG/acetaminophen 325 mG 2 Tablet(s) Oral every 6 hours PRN Moderate Pain (4 - 6)    ==================== RESPIRATORY======================  Full vent support subsequently weaned to pressure support, patient extubated this PM to Aerosol Mask.   Encourage incentive spirometry, continue pulse ox monitoring, follow ABGs     ====================CARDIOVASCULAR==================  Aortic valve regurgitation s/p AVR(t), ascending tube graft on 9/13   Continue invasive hemodynamic monitoring   Lactate peaked to 2.2, now downtrending to 1.7, will continue to monitor closely   Blood pressure support with IV Cardene for hypertension weaned to off post operatively  Maintain inotropic support with IV Dobutamine  Back up pacing wires in place     DOBUTamine Infusion 2 MICROgram(s)/kG/Min (4.69 mL/Hr) IV Continuous <Continuous>  niCARdipine Infusion 5 mG/Hr (25 mL/Hr) IV Continuous <Continuous>    ===================HEMATOLOGIC/ONC ===================  Thrombocytopenia and acute blood loss anemia, monitor H&H/Plts     ===================== RENAL =========================  Stein for monitoring urine output  Continue to monitor I/Os, BUN/Creatinine   Replete lytes PRN. Keep K> 4 and Mg >2    ==================== GASTROINTESTINAL===================  NPO post operatively, will advance diet as tolerated   Bowel regimen with Miralax   Reglan for gut motility     GI prophylaxis, pantoprazole  Injectable 40 milliGRAM(s) IV Push daily  polyethylene glycol 3350 17 Gram(s) Oral daily  metoclopramide Injectable 10 milliGRAM(s) IV Push every 8 hours    =======================    ENDOCRINE  =====================  Stress hyperglycemia, continue glucose control with IV insulin drip     insulin regular Infusion 3 Unit(s)/Hr (3 mL/Hr) IV Continuous <Continuous>    ========================INFECTIOUS DISEASE================  Afebrile, WBC 11.52   Continue trending WBC and monitoring fever curve  Perioperative coverage with Cefuroxime     cefuroxime  IVPB 1500 milliGRAM(s) IV Intermittent every 8 hours      Patient requires continuous monitoring with:  bedside rhythm monitoring, arterial line, pulse oximetry, ventilator management and monitoring; intermittent blood gas analysis.  Care plan discussed with ICU care team.  patient remain critical; required more than usual post op care; I have spent 35 minutes providing non routine post op care, revaluated multiple times during the day       By signing my name below, I, Evelia Lancaster, attest that this documentation has been prepared under the direction and in the presence of Porfirio Ann MD.  Electronically signed: Christi Delgado, 09-13-21 @ 17:38    I, Porfirio Ann, personally performed the services described in this documentation. all medical record entries made by the christi were at my direction and in my presence. I have reviewed the chart and agree that the record reflects my personal performance and is accurate and complete  Electronically signed: Porfirio Ann, 09-13-21 @ 17:38

## 2021-09-13 NOTE — PRE-ANESTHESIA EVALUATION ADULT - NSANTHPMHFT_GEN_ALL_CORE
51 year old male with significant PMHx of bicuspid aortic valve as a child, HLD, HTN, vestibular schwannoma, acoustic neuroma s/p removal in 2012 with sensorineural hearing loss in the left ear, aortic insufficiency as a child, and known thoracic aortic dilation for 40 years

## 2021-09-13 NOTE — BRIEF OPERATIVE NOTE - NSICDXBRIEFPROCEDURE_GEN_ALL_CORE_FT
PROCEDURES:  Replacement, aortic valve, and ascending aorta 13-Sep-2021 14:38:25 AVR (23 Perimount), Ascending aorta repair Sudarshan Nguyen

## 2021-09-13 NOTE — BRIEF OPERATIVE NOTE - VENOUS THROMBOEMBOLISM PROPHYLAXIS THERAPY
Zayda Kessler presents today for Chief Complaint Patient presents with  Hypertension  
  follow up  Thyroid Problem  Insomnia  Medication Refill Zayda Kessler preferred language for health care discussion is english/other. Is someone accompanying this pt? Yes  Is the patient using any DME equipment during OV? no 
 
Depression Screening: PHQ over the last two weeks 1/8/2019 Little interest or pleasure in doing things Several days Feeling down, depressed, irritable, or hopeless Several days Total Score PHQ 2 2 Learning Assessment: 
Learning Assessment 11/15/2018 PRIMARY LEARNER Patient HIGHEST LEVEL OF EDUCATION - PRIMARY LEARNER  2 YEARS OF COLLEGE  
BARRIERS PRIMARY LEARNER NONE  
CO-LEARNER CAREGIVER No  
PRIMARY LANGUAGE ENGLISH  NEED No  
LEARNER PREFERENCE PRIMARY DEMONSTRATION  
ANSWERED BY patient RELATIONSHIP SELF Abuse Screening: 
Abuse Screening Questionnaire 1/8/2019 Do you ever feel afraid of your partner? Jovita Lira Are you in a relationship with someone who physically or mentally threatens you? Jovita Lira Is it safe for you to go home? Quintin Valle Coordination of Care: 1. Have you been to the ER, urgent care clinic since your last visit? Hospitalized since your last visit? No 
 
2. Have you seen or consulted any other health care providers outside of the [address] [place] [address] since your last visit? Include any pap smears or colon screening. Yes Obgyn Per-Dr. Garcia Gone ok medication not taking to be deleted. Advance Directive: 1. Do you have an advance directive in place? Patient Reply:no 2. If not, would you like material regarding how to put one in place?  Patient Reply: no 
 
 
 Heparin

## 2021-09-13 NOTE — AIRWAY REMOVAL NOTE  ADULT & PEDS - ARTIFICAL AIRWAY REMOVAL COMMENTS
Written order for extubation verified. The patient was identified by full name and birth date compared to the identification band. Present during the procedure was louis SANDS

## 2021-09-13 NOTE — PRE-OP CHECKLIST - TO WHOM
Key Antihyperglycemic Medications     Patient is on no antihyperglycemic meds. Other Key Diabetic Medications             simvastatin (ZOCOR) 20 mg tablet  (Taking) TAKE 1 TABLET BY MOUTH NIGHTLY    amLODIPine-benazepril (LOTREL) 5-10 mg per capsule  (Taking) Take 1 Cap by mouth daily. Indications: hypertension    simvastatin (ZOCOR) 20 mg tablet (Discontinued) Take 1 Tab by mouth nightly.  Indications: mixed hyperlipidemia        Lab Results   Component Value Date/Time    Hemoglobin A1c 6.7 09/08/2017 09:35 AM    Glucose 127 09/08/2017 09:35 AM    Creatinine 1.48 09/08/2017 09:35 AM    Cholesterol, total 155 09/08/2017 09:35 AM    HDL Cholesterol 44 09/08/2017 09:35 AM    LDL, calculated 93 09/08/2017 09:35 AM    Triglyceride 89 09/08/2017 09:35 AM     Diabetic Foot and Eye Exam HM Status   Topic Date Due    Diabetic Foot Care  07/24/1945    Eye Exam  10/27/2018
OR RN

## 2021-09-14 LAB
A1C WITH ESTIMATED AVERAGE GLUCOSE RESULT: 5.4 % — SIGNIFICANT CHANGE UP (ref 4–5.6)
ALBUMIN SERPL ELPH-MCNC: 3.8 G/DL — SIGNIFICANT CHANGE UP (ref 3.3–5)
ALP SERPL-CCNC: 55 U/L — SIGNIFICANT CHANGE UP (ref 40–120)
ALT FLD-CCNC: 30 U/L — SIGNIFICANT CHANGE UP (ref 10–45)
ANION GAP SERPL CALC-SCNC: 13 MMOL/L — SIGNIFICANT CHANGE UP (ref 5–17)
APTT BLD: 30.3 SEC — SIGNIFICANT CHANGE UP (ref 27.5–35.5)
AST SERPL-CCNC: 59 U/L — HIGH (ref 10–40)
BASE EXCESS BLDV CALC-SCNC: 2 MMOL/L — SIGNIFICANT CHANGE UP (ref -2–2)
BASOPHILS # BLD AUTO: 0.01 K/UL — SIGNIFICANT CHANGE UP (ref 0–0.2)
BASOPHILS NFR BLD AUTO: 0.1 % — SIGNIFICANT CHANGE UP (ref 0–2)
BILIRUB SERPL-MCNC: 1.6 MG/DL — HIGH (ref 0.2–1.2)
BUN SERPL-MCNC: 16 MG/DL — SIGNIFICANT CHANGE UP (ref 7–23)
CA-I SERPL-SCNC: 1.15 MMOL/L — SIGNIFICANT CHANGE UP (ref 1.15–1.33)
CALCIUM SERPL-MCNC: 8.6 MG/DL — SIGNIFICANT CHANGE UP (ref 8.4–10.5)
CHLORIDE BLDV-SCNC: 106 MMOL/L — SIGNIFICANT CHANGE UP (ref 96–108)
CHLORIDE SERPL-SCNC: 109 MMOL/L — HIGH (ref 96–108)
CO2 BLDV-SCNC: 30 MMOL/L — HIGH (ref 22–26)
CO2 SERPL-SCNC: 22 MMOL/L — SIGNIFICANT CHANGE UP (ref 22–31)
CREAT SERPL-MCNC: 1.01 MG/DL — SIGNIFICANT CHANGE UP (ref 0.5–1.3)
EOSINOPHIL # BLD AUTO: 0 K/UL — SIGNIFICANT CHANGE UP (ref 0–0.5)
EOSINOPHIL NFR BLD AUTO: 0 % — SIGNIFICANT CHANGE UP (ref 0–6)
ESTIMATED AVERAGE GLUCOSE: 108 MG/DL — SIGNIFICANT CHANGE UP (ref 68–114)
GAS PNL BLDA: SIGNIFICANT CHANGE UP
GAS PNL BLDV: 139 MMOL/L — SIGNIFICANT CHANGE UP (ref 136–145)
GAS PNL BLDV: SIGNIFICANT CHANGE UP
GAS PNL BLDV: SIGNIFICANT CHANGE UP
GLUCOSE BLDV-MCNC: 120 MG/DL — HIGH (ref 70–99)
GLUCOSE SERPL-MCNC: 142 MG/DL — HIGH (ref 70–99)
HCO3 BLDV-SCNC: 29 MMOL/L — SIGNIFICANT CHANGE UP (ref 22–29)
HCT VFR BLD CALC: 32 % — LOW (ref 39–50)
HCT VFR BLDA CALC: 34 % — LOW (ref 39–51)
HGB BLD CALC-MCNC: 11.3 G/DL — LOW (ref 12.6–17.4)
HGB BLD-MCNC: 11 G/DL — LOW (ref 13–17)
HOROWITZ INDEX BLDV+IHG-RTO: 28 — SIGNIFICANT CHANGE UP
IMM GRANULOCYTES NFR BLD AUTO: 0.3 % — SIGNIFICANT CHANGE UP (ref 0–1.5)
INR BLD: 1.11 RATIO — SIGNIFICANT CHANGE UP (ref 0.88–1.16)
LACTATE BLDV-MCNC: 1.5 MMOL/L — SIGNIFICANT CHANGE UP (ref 0.7–2)
LYMPHOCYTES # BLD AUTO: 0.69 K/UL — LOW (ref 1–3.3)
LYMPHOCYTES # BLD AUTO: 7.5 % — LOW (ref 13–44)
MAGNESIUM SERPL-MCNC: 2.3 MG/DL — SIGNIFICANT CHANGE UP (ref 1.6–2.6)
MCHC RBC-ENTMCNC: 31.3 PG — SIGNIFICANT CHANGE UP (ref 27–34)
MCHC RBC-ENTMCNC: 34.4 GM/DL — SIGNIFICANT CHANGE UP (ref 32–36)
MCV RBC AUTO: 91.2 FL — SIGNIFICANT CHANGE UP (ref 80–100)
MONOCYTES # BLD AUTO: 0.66 K/UL — SIGNIFICANT CHANGE UP (ref 0–0.9)
MONOCYTES NFR BLD AUTO: 7.1 % — SIGNIFICANT CHANGE UP (ref 2–14)
NEUTROPHILS # BLD AUTO: 7.86 K/UL — HIGH (ref 1.8–7.4)
NEUTROPHILS NFR BLD AUTO: 85 % — HIGH (ref 43–77)
NRBC # BLD: 0 /100 WBCS — SIGNIFICANT CHANGE UP (ref 0–0)
PCO2 BLDV: 53 MMHG — SIGNIFICANT CHANGE UP (ref 42–55)
PH BLDV: 7.34 — SIGNIFICANT CHANGE UP (ref 7.32–7.43)
PHOSPHATE SERPL-MCNC: 3.7 MG/DL — SIGNIFICANT CHANGE UP (ref 2.5–4.5)
PLATELET # BLD AUTO: 121 K/UL — LOW (ref 150–400)
PO2 BLDV: 35 MMHG — SIGNIFICANT CHANGE UP (ref 25–45)
POTASSIUM BLDV-SCNC: 4.1 MMOL/L — SIGNIFICANT CHANGE UP (ref 3.5–5.1)
POTASSIUM SERPL-MCNC: 4 MMOL/L — SIGNIFICANT CHANGE UP (ref 3.5–5.3)
POTASSIUM SERPL-SCNC: 4 MMOL/L — SIGNIFICANT CHANGE UP (ref 3.5–5.3)
PROT SERPL-MCNC: 5.4 G/DL — LOW (ref 6–8.3)
PROTHROM AB SERPL-ACNC: 13.3 SEC — SIGNIFICANT CHANGE UP (ref 10.6–13.6)
RBC # BLD: 3.51 M/UL — LOW (ref 4.2–5.8)
RBC # FLD: 13.5 % — SIGNIFICANT CHANGE UP (ref 10.3–14.5)
SAO2 % BLDV: 65 % — LOW (ref 67–88)
SODIUM SERPL-SCNC: 144 MMOL/L — SIGNIFICANT CHANGE UP (ref 135–145)
WBC # BLD: 9.25 K/UL — SIGNIFICANT CHANGE UP (ref 3.8–10.5)
WBC # FLD AUTO: 9.25 K/UL — SIGNIFICANT CHANGE UP (ref 3.8–10.5)

## 2021-09-14 PROCEDURE — 99291 CRITICAL CARE FIRST HOUR: CPT

## 2021-09-14 PROCEDURE — 93010 ELECTROCARDIOGRAM REPORT: CPT

## 2021-09-14 PROCEDURE — 71045 X-RAY EXAM CHEST 1 VIEW: CPT | Mod: 26

## 2021-09-14 RX ORDER — HYDROMORPHONE HYDROCHLORIDE 2 MG/ML
0.25 INJECTION INTRAMUSCULAR; INTRAVENOUS; SUBCUTANEOUS ONCE
Refills: 0 | Status: DISCONTINUED | OUTPATIENT
Start: 2021-09-14 | End: 2021-09-14

## 2021-09-14 RX ORDER — ENOXAPARIN SODIUM 100 MG/ML
40 INJECTION SUBCUTANEOUS DAILY
Refills: 0 | Status: DISCONTINUED | OUTPATIENT
Start: 2021-09-14 | End: 2021-09-16

## 2021-09-14 RX ORDER — ALBUMIN HUMAN 25 %
250 VIAL (ML) INTRAVENOUS ONCE
Refills: 0 | Status: COMPLETED | OUTPATIENT
Start: 2021-09-14 | End: 2021-09-14

## 2021-09-14 RX ORDER — METOPROLOL TARTRATE 50 MG
25 TABLET ORAL
Refills: 0 | Status: DISCONTINUED | OUTPATIENT
Start: 2021-09-14 | End: 2021-09-15

## 2021-09-14 RX ORDER — INSULIN LISPRO 100/ML
VIAL (ML) SUBCUTANEOUS
Refills: 0 | Status: DISCONTINUED | OUTPATIENT
Start: 2021-09-14 | End: 2021-09-16

## 2021-09-14 RX ORDER — ASPIRIN/CALCIUM CARB/MAGNESIUM 324 MG
81 TABLET ORAL DAILY
Refills: 0 | Status: DISCONTINUED | OUTPATIENT
Start: 2021-09-14 | End: 2021-09-19

## 2021-09-14 RX ORDER — ACETAMINOPHEN 500 MG
1000 TABLET ORAL ONCE
Refills: 0 | Status: COMPLETED | OUTPATIENT
Start: 2021-09-14 | End: 2021-09-14

## 2021-09-14 RX ORDER — PANTOPRAZOLE SODIUM 20 MG/1
40 TABLET, DELAYED RELEASE ORAL
Refills: 0 | Status: DISCONTINUED | OUTPATIENT
Start: 2021-09-14 | End: 2021-09-19

## 2021-09-14 RX ORDER — ATORVASTATIN CALCIUM 80 MG/1
40 TABLET, FILM COATED ORAL AT BEDTIME
Refills: 0 | Status: DISCONTINUED | OUTPATIENT
Start: 2021-09-14 | End: 2021-09-19

## 2021-09-14 RX ORDER — HYDROMORPHONE HYDROCHLORIDE 2 MG/ML
0.5 INJECTION INTRAMUSCULAR; INTRAVENOUS; SUBCUTANEOUS ONCE
Refills: 0 | Status: DISCONTINUED | OUTPATIENT
Start: 2021-09-14 | End: 2021-09-14

## 2021-09-14 RX ORDER — CHLORHEXIDINE GLUCONATE 213 G/1000ML
1 SOLUTION TOPICAL
Refills: 0 | Status: DISCONTINUED | OUTPATIENT
Start: 2021-09-14 | End: 2021-09-19

## 2021-09-14 RX ORDER — INSULIN LISPRO 100/ML
VIAL (ML) SUBCUTANEOUS AT BEDTIME
Refills: 0 | Status: DISCONTINUED | OUTPATIENT
Start: 2021-09-14 | End: 2021-09-16

## 2021-09-14 RX ADMIN — HYDROMORPHONE HYDROCHLORIDE 0.25 MILLIGRAM(S): 2 INJECTION INTRAMUSCULAR; INTRAVENOUS; SUBCUTANEOUS at 11:30

## 2021-09-14 RX ADMIN — HYDROMORPHONE HYDROCHLORIDE 0.5 MILLIGRAM(S): 2 INJECTION INTRAMUSCULAR; INTRAVENOUS; SUBCUTANEOUS at 00:48

## 2021-09-14 RX ADMIN — OXYCODONE AND ACETAMINOPHEN 2 TABLET(S): 5; 325 TABLET ORAL at 20:00

## 2021-09-14 RX ADMIN — Medication 81 MILLIGRAM(S): at 11:02

## 2021-09-14 RX ADMIN — Medication 30 MILLIGRAM(S): at 14:00

## 2021-09-14 RX ADMIN — OXYCODONE AND ACETAMINOPHEN 1 TABLET(S): 5; 325 TABLET ORAL at 07:40

## 2021-09-14 RX ADMIN — Medication 400 MILLIGRAM(S): at 04:04

## 2021-09-14 RX ADMIN — Medication 10 MILLIGRAM(S): at 22:11

## 2021-09-14 RX ADMIN — Medication 25 MILLIGRAM(S): at 18:11

## 2021-09-14 RX ADMIN — Medication 100 MILLIGRAM(S): at 05:23

## 2021-09-14 RX ADMIN — Medication 1000 MILLIGRAM(S): at 04:30

## 2021-09-14 RX ADMIN — Medication 30 MILLIGRAM(S): at 22:10

## 2021-09-14 RX ADMIN — HYDROMORPHONE HYDROCHLORIDE 0.25 MILLIGRAM(S): 2 INJECTION INTRAMUSCULAR; INTRAVENOUS; SUBCUTANEOUS at 14:00

## 2021-09-14 RX ADMIN — Medication 100 MILLIGRAM(S): at 22:10

## 2021-09-14 RX ADMIN — Medication 100 MILLIGRAM(S): at 13:45

## 2021-09-14 RX ADMIN — ATORVASTATIN CALCIUM 40 MILLIGRAM(S): 80 TABLET, FILM COATED ORAL at 22:10

## 2021-09-14 RX ADMIN — Medication 125 MILLILITER(S): at 09:00

## 2021-09-14 RX ADMIN — OXYCODONE AND ACETAMINOPHEN 2 TABLET(S): 5; 325 TABLET ORAL at 12:45

## 2021-09-14 RX ADMIN — Medication 10 MILLIGRAM(S): at 14:00

## 2021-09-14 RX ADMIN — ENOXAPARIN SODIUM 40 MILLIGRAM(S): 100 INJECTION SUBCUTANEOUS at 11:02

## 2021-09-14 RX ADMIN — OXYCODONE AND ACETAMINOPHEN 2 TABLET(S): 5; 325 TABLET ORAL at 20:30

## 2021-09-14 RX ADMIN — OXYCODONE AND ACETAMINOPHEN 2 TABLET(S): 5; 325 TABLET ORAL at 12:14

## 2021-09-14 RX ADMIN — HYDROMORPHONE HYDROCHLORIDE 0.25 MILLIGRAM(S): 2 INJECTION INTRAMUSCULAR; INTRAVENOUS; SUBCUTANEOUS at 13:45

## 2021-09-14 RX ADMIN — CHLORHEXIDINE GLUCONATE 1 APPLICATION(S): 213 SOLUTION TOPICAL at 06:00

## 2021-09-14 RX ADMIN — HYDROMORPHONE HYDROCHLORIDE 0.25 MILLIGRAM(S): 2 INJECTION INTRAMUSCULAR; INTRAVENOUS; SUBCUTANEOUS at 11:15

## 2021-09-14 RX ADMIN — Medication 30 MILLIGRAM(S): at 14:30

## 2021-09-14 RX ADMIN — HYDROMORPHONE HYDROCHLORIDE 0.5 MILLIGRAM(S): 2 INJECTION INTRAMUSCULAR; INTRAVENOUS; SUBCUTANEOUS at 00:30

## 2021-09-14 RX ADMIN — Medication 30 MILLIGRAM(S): at 05:26

## 2021-09-14 RX ADMIN — OXYCODONE AND ACETAMINOPHEN 1 TABLET(S): 5; 325 TABLET ORAL at 08:20

## 2021-09-14 RX ADMIN — Medication 10 MILLIGRAM(S): at 05:36

## 2021-09-14 NOTE — PHYSICAL THERAPY INITIAL EVALUATION ADULT - ADDITIONAL COMMENTS
Pt lives w/ wife in a pvt house w/ 4 steps to enter and 1 flt to bedroom. PTA indep w/ all ADLs w/o an assist device.

## 2021-09-14 NOTE — PHYSICAL THERAPY INITIAL EVALUATION ADULT - PERTINENT HX OF CURRENT PROBLEM, REHAB EVAL
52 yo M w/ PMHx= bicuspid AV as a child, HLD, HTN, vestibular schwannoma, acoustic neuroma s/p removal (2012) w/ sensorineural hearing loss -Ltear, aortic insufficiency as a child, and known thoracic aortic dilation xr 38 ys. Pt now s/p ascending aorta replacement and bio prosthetic AVR (9/13/2021)

## 2021-09-14 NOTE — CONSULT NOTE ADULT - SUBJECTIVE AND OBJECTIVE BOX
Patient is a 51y old  Male who presents with a chief complaint of AVR (t) Asc tube graft (14 Sep 2021 07:21)      HPI:  51 year old male with significant PMHx of bicuspid aortic valve as a child, HLD, HTN, vestibular schwannoma, acoustic neuroma s/p removal in 2012 with sensorineural hearing loss in the left ear, aortic insufficiency as a child, and known thoracic aortic dilation for 40 years presents for [facility] pre ascending aorta replacement and bio prosthetic aortic valve replacement planned on 9/2/2021. Patient with c/o fatigue for the last 3-4 months. Patient was initially monitored by pediatric cardiologist (Dr. German dutta) and then by Dr. Robbins for 40 years with TTE annually. Recent TTE on 11/2020 EF 60%, severe aortic regurgitation. Patient was referred to  for evaluation and management of aortic insufficiency and thoracic aortic dilation with Dr. Vogel. 8/2021 exercise stress echo revealed No significant regional wall abnormalities. Patient referred to Dr. Nichole for further ischemic evaluation pre op.    Patient currently denies chest pain, palpitations, orthopnea or PND.  (31 Aug 2021 08:35)      PAST MEDICAL & SURGICAL HISTORY:  Bicuspid aortic valve    HLD (hyperlipidemia)    Hearing loss  left ear hearing loss due to acoustic neuroma craniotomy    Left ventricular dilation    Severe aortic insufficiency    Thoracic aortic aneurysm    Vestibular schwannoma    Bicuspid aortic valve    Hearing loss  left ear hearing loss due to acoustic neuroma craniotomy 2012    HLD (hyperlipidemia)    Left ventricular dilation    Severe aortic insufficiency    Thoracic aortic aneurysm    Vestibular schwannoma    History of craniotomy  excision of acoustic neuroma 4/2012    S/P colonoscopy    S/P endoscopy    History of craniotomy  excision of acoustic neuroma 4/2012    S/P colonoscopy    S/P endoscopy        FAMILY HISTORY:  Family history of aortic valve disorder (Father, Aunt)    Family history of aortic valve disorder (Father)    Family history of aortic valve disorder (Aunt)        SOCIAL HISTORY:    Allergies    No Known Allergies    Intolerances          REVIEW OF SYSEMS:  General: no weakness, no fever/chills, no weight loss/gain  Skin/Breast: no rash, no jaundice  Ophthalmologic: no vision changes, no dry eyes   Respiratory and Thorax: no cough, no wheezing, no hemoptysis, no dyspnea  Cardiovascular: no chest pain, no shortness of breath, no orthopnea  Gastrointestinal: no n/v/d, no abdominal pain, no dysphagia   Genitourinary: no dysuria, no frequency, no nocturia, no hematuria  Musculoskeletal: no trauma, no sprain/strain, no myalgias, no arthralgias, no fracture  Neurological: no HA, no dizziness, no weakness, no numbness  Psychiatric: no depression, no SI/HI  Hematology/Lymphatics: no easy bruising  Endocrine: no heat or cold intolerance. no weight gain or loss  Allergic/Immunologic: no allergy or recent reaction       Vital Signs Last 24 Hrs  T(C): 37.1 (14 Sep 2021 20:00), Max: 37.8 (13 Sep 2021 23:00)  T(F): 98.8 (14 Sep 2021 20:00), Max: 100 (13 Sep 2021 23:00)  HR: 90 (14 Sep 2021 20:00) (82 - 100)  BP: 125/80 (14 Sep 2021 20:00) (111/78 - 131/80)  BP(mean): 97 (14 Sep 2021 20:00) (87 - 103)  RR: 18 (14 Sep 2021 19:11) (11 - 39)  SpO2: 97% (14 Sep 2021 20:00) (95% - 100%)  I&O's Summary    13 Sep 2021 07:01  -  14 Sep 2021 07:00  --------------------------------------------------------  IN: 1929.6 mL / OUT: 2443 mL / NET: -513.4 mL    14 Sep 2021 07:01  -  14 Sep 2021 22:06  --------------------------------------------------------  IN: 660 mL / OUT: 580 mL / NET: 80 mL        PHYSICAL EXAM:  GENERAL: NAD, Comfortable  HEAD:  Atraumatic, Normocephalic  EYES: EOMI, PERRLA, conjunctiva and sclera clear  NECK: Supple, No JVD  CHEST/LUNG: Clear to auscultation bilaterally; No wheeze  HEART: Regular rate and rhythm; No murmurs, rubs, or gallops  ABDOMEN: Soft, Nontender, Nondistended; Bowel sounds present  Neuro: AAOx3, no focal deficit, 5/5 b/l extremities  EXTREMITIES:  2+ Peripheral Pulses, No clubbing, cyanosis, or edema  SKIN: No rashes or lesions    LABS:                        11.0   9.25  )-----------( 121      ( 14 Sep 2021 01:19 )             32.0     09-14    144  |  109<H>  |  16  ----------------------------<  142<H>  4.0   |  22  |  1.01    Ca    8.6      14 Sep 2021 01:19  Phos  3.7     09-14  Mg     2.3     09-14    TPro  5.4<L>  /  Alb  3.8  /  TBili  1.6<H>  /  DBili  x   /  AST  59<H>  /  ALT  30  /  AlkPhos  55  09-14    PT/INR - ( 14 Sep 2021 01:19 )   PT: 13.3 sec;   INR: 1.11 ratio         PTT - ( 14 Sep 2021 01:19 )  PTT:30.3 sec  CAPILLARY BLOOD GLUCOSE      POCT Blood Glucose.: 154 mg/dL (14 Sep 2021 21:24)  POCT Blood Glucose.: 139 mg/dL (14 Sep 2021 16:36)  POCT Blood Glucose.: 100 mg/dL (14 Sep 2021 11:38)  POCT Blood Glucose.: 103 mg/dL (14 Sep 2021 07:29)  POCT Blood Glucose.: 112 mg/dL (14 Sep 2021 06:51)  POCT Blood Glucose.: 117 mg/dL (14 Sep 2021 06:16)  POCT Blood Glucose.: 120 mg/dL (14 Sep 2021 05:18)  POCT Blood Glucose.: 138 mg/dL (14 Sep 2021 04:00)  POCT Blood Glucose.: 132 mg/dL (14 Sep 2021 02:55)  POCT Blood Glucose.: 135 mg/dL (14 Sep 2021 01:55)  POCT Blood Glucose.: 137 mg/dL (13 Sep 2021 23:56)  POCT Blood Glucose.: 140 mg/dL (13 Sep 2021 23:03)    CARDIAC MARKERS ( 13 Sep 2021 14:45 )  x     / x     / 518 U/L / x     / 55.6 ng/mL          RADIOLOGY & ADDITIONAL TESTS:    Imaging Personally Reviewed:  [x] YES  [ ] NO    Consultant(s) Notes Reviewed:  [x] YES  [ ] NO      MEDICATIONS  (STANDING):  aspirin enteric coated 81 milliGRAM(s) Oral daily  atorvastatin 40 milliGRAM(s) Oral at bedtime  cefuroxime  IVPB 1500 milliGRAM(s) IV Intermittent every 8 hours  chlorhexidine 2% Cloths 1 Application(s) Topical <User Schedule>  dextrose 50% Injectable 50 milliLiter(s) IV Push every 15 minutes  dextrose 50% Injectable 25 milliLiter(s) IV Push every 15 minutes  enoxaparin Injectable 40 milliGRAM(s) SubCutaneous daily  insulin lispro (ADMELOG) corrective regimen sliding scale   SubCutaneous three times a day before meals  insulin lispro (ADMELOG) corrective regimen sliding scale   SubCutaneous at bedtime  ketorolac   Injectable 30 milliGRAM(s) IV Push every 8 hours  metoclopramide Injectable 10 milliGRAM(s) IV Push every 8 hours  metoprolol tartrate 25 milliGRAM(s) Oral two times a day  pantoprazole    Tablet 40 milliGRAM(s) Oral before breakfast  polyethylene glycol 3350 17 Gram(s) Oral daily  sodium chloride 0.9%. 1000 milliLiter(s) (10 mL/Hr) IV Continuous <Continuous>    MEDICATIONS  (PRN):  oxycodone    5 mG/acetaminophen 325 mG 1 Tablet(s) Oral every 4 hours PRN Mild Pain (1 - 3)  oxycodone    5 mG/acetaminophen 325 mG 2 Tablet(s) Oral every 6 hours PRN Moderate Pain (4 - 6)      Care Discussed with Consultants/Other Providers [x] YES  [ ] NO    HEALTH ISSUES - PROBLEM Dx:

## 2021-09-14 NOTE — PROGRESS NOTE ADULT - SUBJECTIVE AND OBJECTIVE BOX
IRVIN AGUIRRE  MRN-[id number]  Patient is a 51y old  Male who presents with a chief complaint of AVR (13 Sep 2021 17:37)      HPI:  51 year old male with significant PMHx of bicuspid aortic valve as a child, HLD, HTN, vestibular schwannoma, acoustic neuroma s/p removal in 2012 with sensorineural hearing loss in the left ear, aortic insufficiency as a child, and known thoracic aortic dilation for 40 years presents for [facility] pre ascending aorta replacement and bio prosthetic aortic valve replacement planned on 9/2/2021. Patient with c/o fatigue for the last 3-4 months. Patient was initially monitored by pediatric cardiologist (Dr. German dutta) and then by Dr. Robbins for 40 years with TTE annually. Recent TTE on 11/2020 EF 60%, severe aortic regurgitation. Patient was referred to  for evaluation and management of aortic insufficiency and thoracic aortic dilation with Dr. Vogel. 8/2021 exercise stress echo revealed No significant regional wall abnormalities. Patient referred to Dr. Nichole for further ischemic evaluation pre op.    Patient currently denies chest pain, palpitations, orthopnea or PND.  (31 Aug 2021 08:35)      Surgery/Hospital Course:  09/13/2021 AVR(t), ascending tube graft     Today:  No acute events     ICU Vital Signs Last 24 Hrs  T(C): 37.2 (14 Sep 2021 04:00), Max: 38.8 (13 Sep 2021 20:00)  T(F): 99 (14 Sep 2021 04:00), Max: 101.8 (13 Sep 2021 20:00)  HR: 89 (14 Sep 2021 07:00) (62 - 103)  BP: 121/76 (14 Sep 2021 07:00) (117/67 - 133/73)  BP(mean): 92 (14 Sep 2021 07:00) (87 - 92)  ABP: 119/79 (14 Sep 2021 05:00) (97/64 - 131/74)  ABP(mean): 91 (14 Sep 2021 05:00) (75 - 91)  RR: 21 (14 Sep 2021 07:00) (0 - 30)  SpO2: 100% (14 Sep 2021 07:00) (97% - 100%)      Physical Exam:  Gen:  Awake, alert   CNS: non focal 	  Neck: no JVD  RES : clear , no wheezing   Chest: + chest tube             CVS: Regular  rhythm. Normal S1/S2  Abd: Soft, non-distended. Bowel sounds present.  Skin: No rash.  Ext:  no edema, A line    ============================I/O===========================   I&O's Detail    13 Sep 2021 07:01  -  14 Sep 2021 07:00  --------------------------------------------------------  IN:    Dexmedetomidine: 28.1 mL    DOBUTamine: 67.5 mL    IV PiggyBack: 200 mL    Lactated Ringers Bolus: 500 mL    NiCARdipine: 34 mL    Oral Fluid: 930 mL    sodium chloride 0.9%: 170 mL  Total IN: 1929.6 mL    OUT:    Bulb (mL): 378 mL    Chest Tube (mL): 125 mL    Indwelling Catheter - Urethral (mL): 1940 mL    Insulin: 0 mL  Total OUT: 2443 mL    Total NET: -513.4 mL        ============================ LABS =========================                        11.0   9.25  )-----------( 121      ( 14 Sep 2021 01:19 )             32.0     09-14    144  |  109<H>  |  16  ----------------------------<  142<H>  4.0   |  22  |  1.01    Ca    8.6      14 Sep 2021 01:19  Phos  3.7     09-14  Mg     2.3     09-14    TPro  5.4<L>  /  Alb  3.8  /  TBili  1.6<H>  /  DBili  x   /  AST  59<H>  /  ALT  30  /  AlkPhos  55  09-14    LIVER FUNCTIONS - ( 14 Sep 2021 01:19 )  Alb: 3.8 g/dL / Pro: 5.4 g/dL / ALK PHOS: 55 U/L / ALT: 30 U/L / AST: 59 U/L / GGT: x           PT/INR - ( 14 Sep 2021 01:19 )   PT: 13.3 sec;   INR: 1.11 ratio         PTT - ( 14 Sep 2021 01:19 )  PTT:30.3 sec  ABG - ( 14 Sep 2021 00:57 )  pH, Arterial: 7.37  pH, Blood: x     /  pCO2: 46    /  pO2: 115   / HCO3: 27    / Base Excess: 0.9   /  SaO2: 99.6            ======================Micro/Rad/Cardio=================  CXR: Reviewed  Echo:Reviewed  ======================================================  PAST MEDICAL & SURGICAL HISTORY:  Bicuspid aortic valve    HLD (hyperlipidemia)    Hearing loss  left ear hearing loss due to acoustic neuroma craniotomy    Left ventricular dilation    Severe aortic insufficiency    Thoracic aortic aneurysm    Vestibular schwannoma    History of craniotomy  excision of acoustic neuroma 4/2012    S/P colonoscopy    S/P endoscopy      ====================ASSESSMENT ==============  Aortic valve regurgitation s/p AVR(t), ascending tube graft on 9/13   Encounter for ventilator management   Hemodynamic instability   Hypertension   Acute blood loss anemia   Thrombocytopenia   Stress hyperglycemia   HLD (hyperlipidemia)  Hypovolemic shock  Post op respiratory insufficiency       Plan:  ====================== NEUROLOGY=====================  Continue close monitoring of neuro status   Percocet/ ketorolac PRN for analgesia  Sedated with IV precedex       dexMEDEtomidine Infusion 1.5 MICROgram(s)/kG/Hr (29.3 mL/Hr) IV Continuous <Continuous>  ketorolac   Injectable 30 milliGRAM(s) IV Push every 8 hours  oxycodone    5 mG/acetaminophen 325 mG 1 Tablet(s) Oral every 4 hours PRN Mild Pain (1 - 3)  oxycodone    5 mG/acetaminophen 325 mG 2 Tablet(s) Oral every 6 hours PRN Moderate Pain (4 - 6)    ==================== RESPIRATORY======================  Encourage incentive spirometry, continue pulse ox monitoring, follow ABGs     Mechanical Ventilation:  Mode: CPAP with PS  FiO2: 50  PEEP: 5  PS: 5  MAP: 9  PIP: 21      ====================CARDIOVASCULAR==================  Aortic valve regurgitation s/p AVR(t), ascending tube graft on 9/13   Continue invasive hemodynamic monitoring   Lactate peaked to 2.2, now downtrending to 1.5, will continue to monitor closely   Blood pressure support with IV Cardene for hypertension weaned to off post operatively  Back up pacing wires in place     niCARdipine Infusion 5 mG/Hr (25 mL/Hr) IV Continuous <Continuous>    ===================HEMATOLOGIC/ONC ===================  Thrombocytopenia and acute blood loss anemia, monitor H&H/Plts     ===================== RENAL =========================  Stein for monitoring urine output  Continue to monitor I/Os, BUN/Creatinine   Replete lytes PRN. Keep K> 4 and Mg >2    ==================== GASTROINTESTINAL===================  tolerating a clear liquid diet, advance as tolerated  Bowel regimen with Miralax   Reglan for gut motility     GI prophylaxis, pantoprazole  Injectable 40 milliGRAM(s) IV Push daily  polyethylene glycol 3350 17 Gram(s) Oral daily  potassium chloride  10 mEq/50 mL IVPB 10 milliEquivalent(s) IV Intermittent every 1 hour  potassium chloride  10 mEq/50 mL IVPB 10 milliEquivalent(s) IV Intermittent every 1 hour  potassium chloride  10 mEq/50 mL IVPB 10 milliEquivalent(s) IV Intermittent every 1 hour  sodium chloride 0.9%. 1000 milliLiter(s) (10 mL/Hr) IV Continuous <Continuous>  metoclopramide Injectable 10 milliGRAM(s) IV Push every 8 hours    =======================    ENDOCRINE  =====================  Stress hyperglycemia, continue glucose control with IV insulin drip     dextrose 50% Injectable 50 milliLiter(s) IV Push every 15 minutes  dextrose 50% Injectable 25 milliLiter(s) IV Push every 15 minutes  insulin regular Infusion 3 Unit(s)/Hr (3 mL/Hr) IV Continuous <Continuous>    ========================INFECTIOUS DISEASE================  Temp 99F, WBC within normal limits  Continue trending WBC and monitoring fever curve  Perioperative coverage with Cefuroxime     cefuroxime  IVPB 1500 milliGRAM(s) IV Intermittent every 8 hours          I have spent 35 minutes providing acute care for this critically ill patient     Patient requires continuous monitoring with bedside rhythm monitoring, pulse ox monitoring, and intermittent blood gas analysis. Care plan discussed with ICU care team. Patient remained critical and at risk for life threatening decompensation.     By signing my name below, I, Carlotta Marrero, attest that this documentation has been prepared under the direction and in the presence of Herman De Los Santos MD   Electronically signed: Rosmery Dietz, 09-14-21 @ 07:22    I, Herman De Los Santos, personally performed the services described in this documentation. all medical record entries made by the scribe were at my direction and in my presence. I have reviewed the chart and agree that the record reflects my personal performance and is accurate and complete  Electronically signed: Herman De Los Santos MD        IRVIN AGUIRRE  MRN-[id number]  Patient is a 51y old  Male who presents with a chief complaint of AVR (13 Sep 2021 17:37)      HPI:  51 year old male with significant PMHx of bicuspid aortic valve as a child, HLD, HTN, vestibular schwannoma, acoustic neuroma s/p removal in 2012 with sensorineural hearing loss in the left ear, aortic insufficiency as a child, and known thoracic aortic dilation for 40 years presents for [facility] pre ascending aorta replacement and bio prosthetic aortic valve replacement planned on 9/2/2021. Patient with c/o fatigue for the last 3-4 months. Patient was initially monitored by pediatric cardiologist (Dr. German dutta) and then by Dr. Robbins for 40 years with TTE annually. Recent TTE on 11/2020 EF 60%, severe aortic regurgitation. Patient was referred to  for evaluation and management of aortic insufficiency and thoracic aortic dilation with Dr. Vogel. 8/2021 exercise stress echo revealed No significant regional wall abnormalities. Patient referred to Dr. Nichole for further ischemic evaluation pre op.    Patient currently denies chest pain, palpitations, orthopnea or PND.  (31 Aug 2021 08:35)      Surgery/Hospital Course:  09/13/2021 AVR(t), ascending tube graft     Today: Plan to remove Stein today. Started lopressor this morning and increased beta blockers. Plan for stepdown.     ICU Vital Signs Last 24 Hrs  T(C): 37.2 (14 Sep 2021 04:00), Max: 38.8 (13 Sep 2021 20:00)  T(F): 99 (14 Sep 2021 04:00), Max: 101.8 (13 Sep 2021 20:00)  HR: 89 (14 Sep 2021 07:00) (62 - 103)  BP: 121/76 (14 Sep 2021 07:00) (117/67 - 133/73)  BP(mean): 92 (14 Sep 2021 07:00) (87 - 92)  ABP: 119/79 (14 Sep 2021 05:00) (97/64 - 131/74)  ABP(mean): 91 (14 Sep 2021 05:00) (75 - 91)  RR: 21 (14 Sep 2021 07:00) (0 - 30)  SpO2: 100% (14 Sep 2021 07:00) (97% - 100%)      Physical Exam:  Gen:  Awake, alert   CNS: non focal 	  Neck: no JVD  RES : clear , no wheezing   Chest: + chest tube             CVS: Regular  rhythm. Normal S1/S2  Abd: Soft, non-distended. Bowel sounds present.  Skin: No rash.  Ext:  no edema, A line    ============================I/O===========================   I&O's Detail    13 Sep 2021 07:01  -  14 Sep 2021 07:00  --------------------------------------------------------  IN:    Dexmedetomidine: 28.1 mL    DOBUTamine: 67.5 mL    IV PiggyBack: 200 mL    Lactated Ringers Bolus: 500 mL    NiCARdipine: 34 mL    Oral Fluid: 930 mL    sodium chloride 0.9%: 170 mL  Total IN: 1929.6 mL    OUT:    Bulb (mL): 378 mL    Chest Tube (mL): 125 mL    Indwelling Catheter - Urethral (mL): 1940 mL    Insulin: 0 mL  Total OUT: 2443 mL    Total NET: -513.4 mL        ============================ LABS =========================                        11.0   9.25  )-----------( 121      ( 14 Sep 2021 01:19 )             32.0     09-14    144  |  109<H>  |  16  ----------------------------<  142<H>  4.0   |  22  |  1.01    Ca    8.6      14 Sep 2021 01:19  Phos  3.7     09-14  Mg     2.3     09-14    TPro  5.4<L>  /  Alb  3.8  /  TBili  1.6<H>  /  DBili  x   /  AST  59<H>  /  ALT  30  /  AlkPhos  55  09-14    LIVER FUNCTIONS - ( 14 Sep 2021 01:19 )  Alb: 3.8 g/dL / Pro: 5.4 g/dL / ALK PHOS: 55 U/L / ALT: 30 U/L / AST: 59 U/L / GGT: x           PT/INR - ( 14 Sep 2021 01:19 )   PT: 13.3 sec;   INR: 1.11 ratio         PTT - ( 14 Sep 2021 01:19 )  PTT:30.3 sec  ABG - ( 14 Sep 2021 00:57 )  pH, Arterial: 7.37  pH, Blood: x     /  pCO2: 46    /  pO2: 115   / HCO3: 27    / Base Excess: 0.9   /  SaO2: 99.6            ======================Micro/Rad/Cardio=================  CXR: Reviewed  Echo:Reviewed  ======================================================  PAST MEDICAL & SURGICAL HISTORY:  Bicuspid aortic valve    HLD (hyperlipidemia)    Hearing loss  left ear hearing loss due to acoustic neuroma craniotomy    Left ventricular dilation    Severe aortic insufficiency    Thoracic aortic aneurysm    Vestibular schwannoma    History of craniotomy  excision of acoustic neuroma 4/2012    S/P colonoscopy    S/P endoscopy      ====================ASSESSMENT ==============  Aortic valve regurgitation s/p AVR(t), ascending tube graft on 9/13   Encounter for ventilator management   Hemodynamic instability   Hypertension   Acute blood loss anemia   Thrombocytopenia   Stress hyperglycemia   HLD (hyperlipidemia)  Hypovolemic shock  Post op respiratory insufficiency       Plan:  ====================== NEUROLOGY=====================  Continue close monitoring of neuro status   Percocet/ ketorolac PRN for analgesia  Sedated with IV precedex       dexMEDEtomidine Infusion 1.5 MICROgram(s)/kG/Hr (29.3 mL/Hr) IV Continuous <Continuous>  ketorolac   Injectable 30 milliGRAM(s) IV Push every 8 hours  oxycodone    5 mG/acetaminophen 325 mG 1 Tablet(s) Oral every 4 hours PRN Mild Pain (1 - 3)  oxycodone    5 mG/acetaminophen 325 mG 2 Tablet(s) Oral every 6 hours PRN Moderate Pain (4 - 6)    ==================== RESPIRATORY======================  Encourage incentive spirometry, continue pulse ox monitoring, follow ABGs     Mechanical Ventilation:  Mode: CPAP with PS  FiO2: 50  PEEP: 5  PS: 5  MAP: 9  PIP: 21      ====================CARDIOVASCULAR==================  Aortic valve regurgitation s/p AVR(t), ascending tube graft on 9/13   Continue invasive hemodynamic monitoring   Lactate peaked to 2.2, now downtrending to 1.5, will continue to monitor closely   Started lopressor this morning and increased beta blockers  Blood pressure support with IV Cardene for hypertension weaned to off post operatively  Back up pacing wires in place     niCARdipine Infusion 5 mG/Hr (25 mL/Hr) IV Continuous <Continuous>    ===================HEMATOLOGIC/ONC ===================  Thrombocytopenia and acute blood loss anemia, monitor H&H/Plts     ===================== RENAL =========================  Plan to remove Stien today  Continue to monitor I/Os, BUN/Creatinine   Replete lytes PRN. Keep K> 4 and Mg >2    ==================== GASTROINTESTINAL===================  tolerating a clear liquid diet, advance as tolerated  Bowel regimen with Miralax   Reglan for gut motility     GI prophylaxis, pantoprazole  Injectable 40 milliGRAM(s) IV Push daily  polyethylene glycol 3350 17 Gram(s) Oral daily  potassium chloride  10 mEq/50 mL IVPB 10 milliEquivalent(s) IV Intermittent every 1 hour  potassium chloride  10 mEq/50 mL IVPB 10 milliEquivalent(s) IV Intermittent every 1 hour  potassium chloride  10 mEq/50 mL IVPB 10 milliEquivalent(s) IV Intermittent every 1 hour  sodium chloride 0.9%. 1000 milliLiter(s) (10 mL/Hr) IV Continuous <Continuous>  metoclopramide Injectable 10 milliGRAM(s) IV Push every 8 hours    =======================    ENDOCRINE  =====================  Stress hyperglycemia, continue glucose control with IV insulin drip     dextrose 50% Injectable 50 milliLiter(s) IV Push every 15 minutes  dextrose 50% Injectable 25 milliLiter(s) IV Push every 15 minutes  insulin regular Infusion 3 Unit(s)/Hr (3 mL/Hr) IV Continuous <Continuous>    ========================INFECTIOUS DISEASE================  Temp 99F, WBC within normal limits  Continue trending WBC and monitoring fever curve  Perioperative coverage with Cefuroxime     cefuroxime  IVPB 1500 milliGRAM(s) IV Intermittent every 8 hours          I have spent 35 minutes providing acute care for this critically ill patient     Patient requires continuous monitoring with bedside rhythm monitoring, pulse ox monitoring, and intermittent blood gas analysis. Care plan discussed with ICU care team. Patient remained critical and at risk for life threatening decompensation.     By signing my name below, I, Carlotta Marrero, attest that this documentation has been prepared under the direction and in the presence of Herman De Los Santos MD   Electronically signed: Rosmery Dietz, 09-14-21 @ 07:22    I, Herman De Los Santos, personally performed the services described in this documentation. all medical record entries made by the scribe were at my direction and in my presence. I have reviewed the chart and agree that the record reflects my personal performance and is accurate and complete  Electronically signed: Herman De Los Santos MD

## 2021-09-14 NOTE — CONSULT NOTE ADULT - ASSESSMENT
51 year old male with significant PMHx of bicuspid aortic valve as a child, HLD, HTN, vestibular schwannoma, acoustic neuroma s/p removal in 2012 with sensorineural hearing loss in the left ear, aortic insufficiency as a child, and known thoracic aortic dilation for 40 years    sp ascending aorta replacement and bio prosthetic aortic valve replacement9/2       sp Aortic valve regurgitation s/p AVR(t), ascending tube graft on 9/13   chart reviewed  d/w pt plan of care  mgmt per CTsx team    will update Dr Sharita Sims will be covering  starting 9/15/21  please call [email or web address] @ [phone] for questions or concerns

## 2021-09-15 DIAGNOSIS — Z98.890 OTHER SPECIFIED POSTPROCEDURAL STATES: ICD-10-CM

## 2021-09-15 DIAGNOSIS — Z95.2 PRESENCE OF PROSTHETIC HEART VALVE: ICD-10-CM

## 2021-09-15 LAB
ALBUMIN SERPL ELPH-MCNC: 3.7 G/DL — SIGNIFICANT CHANGE UP (ref 3.3–5)
ALP SERPL-CCNC: 53 U/L — SIGNIFICANT CHANGE UP (ref 40–120)
ALT FLD-CCNC: 27 U/L — SIGNIFICANT CHANGE UP (ref 10–45)
ANION GAP SERPL CALC-SCNC: 11 MMOL/L — SIGNIFICANT CHANGE UP (ref 5–17)
ANION GAP SERPL CALC-SCNC: 11 MMOL/L — SIGNIFICANT CHANGE UP (ref 5–17)
AST SERPL-CCNC: 35 U/L — SIGNIFICANT CHANGE UP (ref 10–40)
BASOPHILS # BLD AUTO: 0.02 K/UL — SIGNIFICANT CHANGE UP (ref 0–0.2)
BASOPHILS NFR BLD AUTO: 0.2 % — SIGNIFICANT CHANGE UP (ref 0–2)
BILIRUB SERPL-MCNC: 0.8 MG/DL — SIGNIFICANT CHANGE UP (ref 0.2–1.2)
BUN SERPL-MCNC: 20 MG/DL — SIGNIFICANT CHANGE UP (ref 7–23)
BUN SERPL-MCNC: 23 MG/DL — SIGNIFICANT CHANGE UP (ref 7–23)
CALCIUM SERPL-MCNC: 9 MG/DL — SIGNIFICANT CHANGE UP (ref 8.4–10.5)
CALCIUM SERPL-MCNC: 9.4 MG/DL — SIGNIFICANT CHANGE UP (ref 8.4–10.5)
CHLORIDE SERPL-SCNC: 101 MMOL/L — SIGNIFICANT CHANGE UP (ref 96–108)
CHLORIDE SERPL-SCNC: 106 MMOL/L — SIGNIFICANT CHANGE UP (ref 96–108)
CO2 SERPL-SCNC: 25 MMOL/L — SIGNIFICANT CHANGE UP (ref 22–31)
CO2 SERPL-SCNC: 25 MMOL/L — SIGNIFICANT CHANGE UP (ref 22–31)
CREAT SERPL-MCNC: 0.9 MG/DL — SIGNIFICANT CHANGE UP (ref 0.5–1.3)
CREAT SERPL-MCNC: 0.91 MG/DL — SIGNIFICANT CHANGE UP (ref 0.5–1.3)
EOSINOPHIL # BLD AUTO: 0.03 K/UL — SIGNIFICANT CHANGE UP (ref 0–0.5)
EOSINOPHIL NFR BLD AUTO: 0.3 % — SIGNIFICANT CHANGE UP (ref 0–6)
GLUCOSE SERPL-MCNC: 120 MG/DL — HIGH (ref 70–99)
GLUCOSE SERPL-MCNC: 137 MG/DL — HIGH (ref 70–99)
HCT VFR BLD CALC: 32.1 % — LOW (ref 39–50)
HGB BLD-MCNC: 10.6 G/DL — LOW (ref 13–17)
IMM GRANULOCYTES NFR BLD AUTO: 0.3 % — SIGNIFICANT CHANGE UP (ref 0–1.5)
LYMPHOCYTES # BLD AUTO: 2.43 K/UL — SIGNIFICANT CHANGE UP (ref 1–3.3)
LYMPHOCYTES # BLD AUTO: 21.9 % — SIGNIFICANT CHANGE UP (ref 13–44)
MAGNESIUM SERPL-MCNC: 2.1 MG/DL — SIGNIFICANT CHANGE UP (ref 1.6–2.6)
MAGNESIUM SERPL-MCNC: 2.2 MG/DL — SIGNIFICANT CHANGE UP (ref 1.6–2.6)
MCHC RBC-ENTMCNC: 30.7 PG — SIGNIFICANT CHANGE UP (ref 27–34)
MCHC RBC-ENTMCNC: 33 GM/DL — SIGNIFICANT CHANGE UP (ref 32–36)
MCV RBC AUTO: 93 FL — SIGNIFICANT CHANGE UP (ref 80–100)
MONOCYTES # BLD AUTO: 0.97 K/UL — HIGH (ref 0–0.9)
MONOCYTES NFR BLD AUTO: 8.7 % — SIGNIFICANT CHANGE UP (ref 2–14)
NEUTROPHILS # BLD AUTO: 7.64 K/UL — HIGH (ref 1.8–7.4)
NEUTROPHILS NFR BLD AUTO: 68.6 % — SIGNIFICANT CHANGE UP (ref 43–77)
NRBC # BLD: 0 /100 WBCS — SIGNIFICANT CHANGE UP (ref 0–0)
PHOSPHATE SERPL-MCNC: 1.7 MG/DL — LOW (ref 2.5–4.5)
PLATELET # BLD AUTO: 119 K/UL — LOW (ref 150–400)
POTASSIUM SERPL-MCNC: 4.1 MMOL/L — SIGNIFICANT CHANGE UP (ref 3.5–5.3)
POTASSIUM SERPL-MCNC: 4.1 MMOL/L — SIGNIFICANT CHANGE UP (ref 3.5–5.3)
POTASSIUM SERPL-SCNC: 4.1 MMOL/L — SIGNIFICANT CHANGE UP (ref 3.5–5.3)
POTASSIUM SERPL-SCNC: 4.1 MMOL/L — SIGNIFICANT CHANGE UP (ref 3.5–5.3)
PROT SERPL-MCNC: 5.7 G/DL — LOW (ref 6–8.3)
RBC # BLD: 3.45 M/UL — LOW (ref 4.2–5.8)
RBC # FLD: 13.8 % — SIGNIFICANT CHANGE UP (ref 10.3–14.5)
SODIUM SERPL-SCNC: 137 MMOL/L — SIGNIFICANT CHANGE UP (ref 135–145)
SODIUM SERPL-SCNC: 142 MMOL/L — SIGNIFICANT CHANGE UP (ref 135–145)
SURGICAL PATHOLOGY STUDY: SIGNIFICANT CHANGE UP
WBC # BLD: 11.12 K/UL — HIGH (ref 3.8–10.5)
WBC # FLD AUTO: 11.12 K/UL — HIGH (ref 3.8–10.5)

## 2021-09-15 PROCEDURE — 93010 ELECTROCARDIOGRAM REPORT: CPT

## 2021-09-15 PROCEDURE — 71045 X-RAY EXAM CHEST 1 VIEW: CPT | Mod: 26

## 2021-09-15 RX ORDER — METOPROLOL TARTRATE 50 MG
25 TABLET ORAL EVERY 8 HOURS
Refills: 0 | Status: DISCONTINUED | OUTPATIENT
Start: 2021-09-15 | End: 2021-09-15

## 2021-09-15 RX ORDER — AMIODARONE HYDROCHLORIDE 400 MG/1
400 TABLET ORAL EVERY 8 HOURS
Refills: 0 | Status: COMPLETED | OUTPATIENT
Start: 2021-09-15 | End: 2021-09-19

## 2021-09-15 RX ORDER — FUROSEMIDE 40 MG
20 TABLET ORAL DAILY
Refills: 0 | Status: DISCONTINUED | OUTPATIENT
Start: 2021-09-15 | End: 2021-09-19

## 2021-09-15 RX ORDER — AMIODARONE HYDROCHLORIDE 400 MG/1
TABLET ORAL
Refills: 0 | Status: DISCONTINUED | OUTPATIENT
Start: 2021-09-19 | End: 2021-09-19

## 2021-09-15 RX ORDER — SPIRONOLACTONE 25 MG/1
25 TABLET, FILM COATED ORAL DAILY
Refills: 0 | Status: DISCONTINUED | OUTPATIENT
Start: 2021-09-15 | End: 2021-09-19

## 2021-09-15 RX ORDER — AMIODARONE HYDROCHLORIDE 400 MG/1
200 TABLET ORAL DAILY
Refills: 0 | Status: DISCONTINUED | OUTPATIENT
Start: 2021-09-19 | End: 2021-09-19

## 2021-09-15 RX ORDER — INFLUENZA VIRUS VACCINE 15; 15; 15; 15 UG/.5ML; UG/.5ML; UG/.5ML; UG/.5ML
0.5 SUSPENSION INTRAMUSCULAR ONCE
Refills: 0 | Status: COMPLETED | OUTPATIENT
Start: 2021-09-15 | End: 2021-09-15

## 2021-09-15 RX ORDER — METOPROLOL TARTRATE 50 MG
50 TABLET ORAL EVERY 8 HOURS
Refills: 0 | Status: DISCONTINUED | OUTPATIENT
Start: 2021-09-15 | End: 2021-09-19

## 2021-09-15 RX ORDER — METOPROLOL TARTRATE 50 MG
25 TABLET ORAL ONCE
Refills: 0 | Status: COMPLETED | OUTPATIENT
Start: 2021-09-15 | End: 2021-09-15

## 2021-09-15 RX ORDER — METOPROLOL TARTRATE 50 MG
5 TABLET ORAL ONCE
Refills: 0 | Status: COMPLETED | OUTPATIENT
Start: 2021-09-15 | End: 2021-09-15

## 2021-09-15 RX ADMIN — ATORVASTATIN CALCIUM 40 MILLIGRAM(S): 80 TABLET, FILM COATED ORAL at 21:31

## 2021-09-15 RX ADMIN — OXYCODONE AND ACETAMINOPHEN 2 TABLET(S): 5; 325 TABLET ORAL at 16:57

## 2021-09-15 RX ADMIN — PANTOPRAZOLE SODIUM 40 MILLIGRAM(S): 20 TABLET, DELAYED RELEASE ORAL at 08:05

## 2021-09-15 RX ADMIN — Medication 30 MILLIGRAM(S): at 14:00

## 2021-09-15 RX ADMIN — Medication 30 MILLIGRAM(S): at 05:43

## 2021-09-15 RX ADMIN — Medication 30 MILLIGRAM(S): at 13:18

## 2021-09-15 RX ADMIN — Medication 81 MILLIGRAM(S): at 13:17

## 2021-09-15 RX ADMIN — Medication 5 MILLIGRAM(S): at 17:45

## 2021-09-15 RX ADMIN — Medication 25 MILLIGRAM(S): at 17:45

## 2021-09-15 RX ADMIN — AMIODARONE HYDROCHLORIDE 400 MILLIGRAM(S): 400 TABLET ORAL at 17:45

## 2021-09-15 RX ADMIN — OXYCODONE AND ACETAMINOPHEN 2 TABLET(S): 5; 325 TABLET ORAL at 17:30

## 2021-09-15 RX ADMIN — Medication 50 MILLIGRAM(S): at 21:31

## 2021-09-15 RX ADMIN — OXYCODONE AND ACETAMINOPHEN 2 TABLET(S): 5; 325 TABLET ORAL at 10:50

## 2021-09-15 RX ADMIN — SPIRONOLACTONE 25 MILLIGRAM(S): 25 TABLET, FILM COATED ORAL at 05:43

## 2021-09-15 RX ADMIN — CHLORHEXIDINE GLUCONATE 1 APPLICATION(S): 213 SOLUTION TOPICAL at 05:24

## 2021-09-15 RX ADMIN — Medication 20 MILLIGRAM(S): at 05:43

## 2021-09-15 RX ADMIN — OXYCODONE AND ACETAMINOPHEN 2 TABLET(S): 5; 325 TABLET ORAL at 11:30

## 2021-09-15 RX ADMIN — Medication 25 MILLIGRAM(S): at 13:17

## 2021-09-15 RX ADMIN — POLYETHYLENE GLYCOL 3350 17 GRAM(S): 17 POWDER, FOR SOLUTION ORAL at 13:16

## 2021-09-15 RX ADMIN — ENOXAPARIN SODIUM 40 MILLIGRAM(S): 100 INJECTION SUBCUTANEOUS at 13:00

## 2021-09-15 RX ADMIN — Medication 25 MILLIGRAM(S): at 05:43

## 2021-09-15 NOTE — PROGRESS NOTE ADULT - ASSESSMENT
51 year old male with significant PMHx of bicuspid aortic valve as a child, HLD, HTN, vestibular schwannoma, acoustic neuroma s/p removal in 2012 with sensorineural hearing loss in the left ear, aortic insufficiency as a child, and known thoracic aortic dilation for 40 years presents for [facility] pre ascending aorta replacement and bio prosthetic aortic valve replacement planned on 9/2/2021. Patient with c/o fatigue for the last 3-4 months. Recent TTE on 11/2020 EF 60%, severe aortic regurgitation. Patient was referred for evaluation and management of aortic insufficiency and thoracic aortic dilation with Dr. Vogel. Presents to [facility] for cardiac surgery.   On 9/13/21 s/p AVR (T) # 23 (Perimount) / Ascending aorta repair with 34 mm gelweave graft. Intra op received FEIBA / 1 bag Platelet and use of cellsaver and cardiotomy suction  Post op Course:   Extubated POD # 0   Inotropic support required à On Dobutamine gtt weaned off.   Hypertension requiring Cardene gtt à weaned off.   Stress Hyperglycemia requiring insulin gtt à weaned off   9/14 Transferred to SDU overnight; MS Alber marcial SS   9/15 VVS; MS Campo drain D/C’d this AM.  Started on Lasix 20 mg PO daily and Aldactone 25 mg   PO daily.  Lopressor increased to 25 mg PO TID. Right IJ D/C’d.  Patient may transfer to the floor.

## 2021-09-15 NOTE — PROGRESS NOTE ADULT - SUBJECTIVE AND OBJECTIVE BOX
VITAL SIGNS    Subjective: "I'm feeling ok." Denies CP, palpitation, SOB, REYNOLDS, HA, dizziness, N/V/D, fever or chills.  No acute event noted overnight.     Telemetry: NSR 70-90       Vital Signs Last 24 Hrs  T(C): 36.7 (09-15-21 @ 11:03), Max: 37.6 (09-15-21 @ 02:55)  T(F): 98 (09-15-21 @ 11:03), Max: 99.6 (09-15-21 @ 02:55)  HR: 83 (09-15-21 @ 13:17) (79 - 92)  BP: 125/70 (09-15-21 @ 13:17) (105/61 - 126/83)  RR: 18 (09-15-21 @ 11:03) (18 - 18)  SpO2: 96% (09-15-21 @ 11:03) (90% - 97%)            07:01  -  09-15 @ 07:00  --------------------------------------------------------  IN: 810 mL / OUT: 580 mL / NET: 230 mL    09-15 @ 07:01  -  09-15 @ 15:01  --------------------------------------------------------  IN: 240 mL / OUT: 500 mL / NET: -260 mL    Daily     Daily Weight in k.3 (15 Sep 2021 08:41)    CAPILLARY BLOOD GLUCOSE    POCT Blood Glucose.: 132 mg/dL (15 Sep 2021 11:54)  POCT Blood Glucose.: 111 mg/dL (15 Sep 2021 08:11)  POCT Blood Glucose.: 154 mg/dL (14 Sep 2021 21:24)  POCT Blood Glucose.: 139 mg/dL (14 Sep 2021 16:36)        PHYSICAL EXAM    Neurology: alert and oriented x 3, nonfocal, no gross deficits    CV: (+) S1 and S2, No murmurs, rubs, gallops or clicks     Sternal Wound:  MSI -->CDI , sternum stable; PW x 2 --> EPM VVI 50/ vma 10     Lungs: CTA B/L     Abdomen: soft, nontender, nondistended, positive bowel sounds, (+) Flatus; (+) BM     :  Voiding               Extremities:  B/L LE (+) edema; negative calf tenderness; (+) 2 DP palpable        aspirin enteric coated 81 milliGRAM(s) Oral daily  atorvastatin 40 milliGRAM(s) Oral at bedtime  chlorhexidine 2% Cloths 1 Application(s) Topical <User Schedule>  dextrose 50% Injectable 50 milliLiter(s) IV Push every 15 minutes  dextrose 50% Injectable 25 milliLiter(s) IV Push every 15 minutes  enoxaparin Injectable 40 milliGRAM(s) SubCutaneous daily  furosemide  Tablet 20 milliGRAM(s) Oral daily  influenza Vaccine 0.5 milliLiter(s) IntraMuscular once  insulin lispro (ADMELOG) corrective regimen sliding scale SubCutaneous three times a day before meals  insulin lispro (ADMELOG) corrective regimen sliding scale SubCutaneous at bedtime  metoprolol tartrate 25 milliGRAM(s) Oral every 8 hours  oxycodone 5 mG/acetaminophen 325 mG 1 Tablet(s) Oral every 4 hours PRN  oxycodone 5 mG/acetaminophen 325 mG 2 Tablet(s) Oral every 6 hours PRN  pantoprazole Tablet 40 milliGRAM(s) Oral before breakfast  polyethylene glycol 3350 17 Gram(s) Oral daily  sodium chloride 0.9%. 1000 milliLiter(s) IV Continuous <Continuous>  spironolactone 25 milliGRAM(s) Oral daily    Physical Therapy Rec:   Home  [  ]   Home w/ PT  [ X ]  Rehab  [  ]    Discussed with Cardiothoracic Team at AM rounds.

## 2021-09-15 NOTE — PROGRESS NOTE ADULT - SUBJECTIVE AND OBJECTIVE BOX
SUBJECTIVE / OVERNIGHT EVENTS:      INCOMPLETE NOTE    --------------------------------------------------------------------------------------------  LABS:                        10.6   11.12 )-----------( 119      ( 15 Sep 2021 05:56 )             32.1     09-15    142  |  106  |  23  ----------------------------<  120<H>  4.1   |  25  |  0.91    Ca    9.0      15 Sep 2021 05:56  Phos  1.7     09-15  Mg     2.2     09-15    TPro  5.7<L>  /  Alb  3.7  /  TBili  0.8  /  DBili  x   /  AST  35  /  ALT  27  /  AlkPhos  53  09-15    PT/INR - ( 14 Sep 2021 01:19 )   PT: 13.3 sec;   INR: 1.11 ratio         PTT - ( 14 Sep 2021 01:19 )  PTT:30.3 sec  CAPILLARY BLOOD GLUCOSE      POCT Blood Glucose.: 132 mg/dL (15 Sep 2021 11:54)  POCT Blood Glucose.: 111 mg/dL (15 Sep 2021 08:11)  POCT Blood Glucose.: 154 mg/dL (14 Sep 2021 21:24)  POCT Blood Glucose.: 139 mg/dL (14 Sep 2021 16:36)    CARDIAC MARKERS ( 13 Sep 2021 14:45 )  x     / x     / 518 U/L / x     / 55.6 ng/mL          RADIOLOGY & ADDITIONAL TESTS:    Imaging Personally Reviewed:  [x] YES  [ ] NO    Consultant(s) Notes Reviewed:  [x] YES  [ ] NO    MEDICATIONS  (STANDING):  aspirin enteric coated 81 milliGRAM(s) Oral daily  atorvastatin 40 milliGRAM(s) Oral at bedtime  chlorhexidine 2% Cloths 1 Application(s) Topical <User Schedule>  dextrose 50% Injectable 50 milliLiter(s) IV Push every 15 minutes  dextrose 50% Injectable 25 milliLiter(s) IV Push every 15 minutes  enoxaparin Injectable 40 milliGRAM(s) SubCutaneous daily  furosemide    Tablet 20 milliGRAM(s) Oral daily  influenza   Vaccine 0.5 milliLiter(s) IntraMuscular once  insulin lispro (ADMELOG) corrective regimen sliding scale   SubCutaneous three times a day before meals  insulin lispro (ADMELOG) corrective regimen sliding scale   SubCutaneous at bedtime  ketorolac   Injectable 30 milliGRAM(s) IV Push every 8 hours  metoprolol tartrate 25 milliGRAM(s) Oral every 8 hours  pantoprazole    Tablet 40 milliGRAM(s) Oral before breakfast  polyethylene glycol 3350 17 Gram(s) Oral daily  sodium chloride 0.9%. 1000 milliLiter(s) (10 mL/Hr) IV Continuous <Continuous>  spironolactone 25 milliGRAM(s) Oral daily    MEDICATIONS  (PRN):  oxycodone    5 mG/acetaminophen 325 mG 1 Tablet(s) Oral every 4 hours PRN Mild Pain (1 - 3)  oxycodone    5 mG/acetaminophen 325 mG 2 Tablet(s) Oral every 6 hours PRN Moderate Pain (4 - 6)      Care Discussed with Consultants/Other Providers [x] YES  [ ] NO    Vital Signs Last 24 Hrs  T(C): 37.1 (15 Sep 2021 07:05), Max: 37.6 (15 Sep 2021 02:55)  T(F): 98.8 (15 Sep 2021 07:05), Max: 99.6 (15 Sep 2021 02:55)  HR: 83 (15 Sep 2021 07:05) (79 - 92)  BP: 108/69 (15 Sep 2021 07:05) (107/79 - 129/89)  BP(mean): 83 (15 Sep 2021 07:05) (83 - 103)  RR: 18 (15 Sep 2021 07:05) (17 - 23)  SpO2: 94% (15 Sep 2021 07:05) (90% - 100%)  I&O's Summary    14 Sep 2021 07:01  -  15 Sep 2021 07:00  --------------------------------------------------------  IN: 810 mL / OUT: 580 mL / NET: 230 mL    15 Sep 2021 07:01  -  15 Sep 2021 12:23  --------------------------------------------------------  IN: 240 mL / OUT: 500 mL / NET: -260 mL    PHYSICAL EXAM:  GENERAL: NAD, well-developed, comfortable  HEAD:  Atraumatic, Normocephalic  EYES: EOMI, PERRLA, conjunctiva and sclera clear  NECK: Supple, No JVD  CHEST/LUNG: Clear to auscultation bilaterally; No wheeze  HEART: Regular rate and rhythm; No murmurs, rubs, or gallops  ABDOMEN: Soft, Nontender, Nondistended; Bowel sounds present  NEURO: AAOx3, no focal weakness, 5/5 b/l extremity strength, b/l knee no arthritis, no effusion   EXTREMITIES:  2+ Peripheral Pulses, No clubbing, cyanosis, or edema  SKIN: No rashes or lesions             SUBJECTIVE / OVERNIGHT EVENTS:      pt seen and examined this morning. states he is doing better than yesterday. some chest pain with incision. no palpitations. no sob.    --------------------------------------------------------------------------------------------  LABS:                        10.6   11.12 )-----------( 119      ( 15 Sep 2021 05:56 )             32.1     09-15    142  |  106  |  23  ----------------------------<  120<H>  4.1   |  25  |  0.91    Ca    9.0      15 Sep 2021 05:56  Phos  1.7     09-15  Mg     2.2     09-15    TPro  5.7<L>  /  Alb  3.7  /  TBili  0.8  /  DBili  x   /  AST  35  /  ALT  27  /  AlkPhos  53  09-15    PT/INR - ( 14 Sep 2021 01:19 )   PT: 13.3 sec;   INR: 1.11 ratio         PTT - ( 14 Sep 2021 01:19 )  PTT:30.3 sec  CAPILLARY BLOOD GLUCOSE      POCT Blood Glucose.: 132 mg/dL (15 Sep 2021 11:54)  POCT Blood Glucose.: 111 mg/dL (15 Sep 2021 08:11)  POCT Blood Glucose.: 154 mg/dL (14 Sep 2021 21:24)  POCT Blood Glucose.: 139 mg/dL (14 Sep 2021 16:36)    CARDIAC MARKERS ( 13 Sep 2021 14:45 )  x     / x     / 518 U/L / x     / 55.6 ng/mL          RADIOLOGY & ADDITIONAL TESTS:    Imaging Personally Reviewed:  [x] YES  [ ] NO    Consultant(s) Notes Reviewed:  [x] YES  [ ] NO    MEDICATIONS  (STANDING):  aspirin enteric coated 81 milliGRAM(s) Oral daily  atorvastatin 40 milliGRAM(s) Oral at bedtime  chlorhexidine 2% Cloths 1 Application(s) Topical <User Schedule>  dextrose 50% Injectable 50 milliLiter(s) IV Push every 15 minutes  dextrose 50% Injectable 25 milliLiter(s) IV Push every 15 minutes  enoxaparin Injectable 40 milliGRAM(s) SubCutaneous daily  furosemide    Tablet 20 milliGRAM(s) Oral daily  influenza   Vaccine 0.5 milliLiter(s) IntraMuscular once  insulin lispro (ADMELOG) corrective regimen sliding scale   SubCutaneous three times a day before meals  insulin lispro (ADMELOG) corrective regimen sliding scale   SubCutaneous at bedtime  ketorolac   Injectable 30 milliGRAM(s) IV Push every 8 hours  metoprolol tartrate 25 milliGRAM(s) Oral every 8 hours  pantoprazole    Tablet 40 milliGRAM(s) Oral before breakfast  polyethylene glycol 3350 17 Gram(s) Oral daily  sodium chloride 0.9%. 1000 milliLiter(s) (10 mL/Hr) IV Continuous <Continuous>  spironolactone 25 milliGRAM(s) Oral daily    MEDICATIONS  (PRN):  oxycodone    5 mG/acetaminophen 325 mG 1 Tablet(s) Oral every 4 hours PRN Mild Pain (1 - 3)  oxycodone    5 mG/acetaminophen 325 mG 2 Tablet(s) Oral every 6 hours PRN Moderate Pain (4 - 6)      Care Discussed with Consultants/Other Providers [x] YES  [ ] NO    Vital Signs Last 24 Hrs  T(C): 37.1 (15 Sep 2021 07:05), Max: 37.6 (15 Sep 2021 02:55)  T(F): 98.8 (15 Sep 2021 07:05), Max: 99.6 (15 Sep 2021 02:55)  HR: 83 (15 Sep 2021 07:05) (79 - 92)  BP: 108/69 (15 Sep 2021 07:05) (107/79 - 129/89)  BP(mean): 83 (15 Sep 2021 07:05) (83 - 103)  RR: 18 (15 Sep 2021 07:05) (17 - 23)  SpO2: 94% (15 Sep 2021 07:05) (90% - 100%)  I&O's Summary    14 Sep 2021 07:01  -  15 Sep 2021 07:00  --------------------------------------------------------  IN: 810 mL / OUT: 580 mL / NET: 230 mL    15 Sep 2021 07:01  -  15 Sep 2021 12:23  --------------------------------------------------------  IN: 240 mL / OUT: 500 mL / NET: -260 mL    PHYSICAL EXAM:  GENERAL: NAD, well-developed, comfortable  HEAD:  Atraumatic, Normocephalic, right neck IJ  CHEST/LUNG: Clear to auscultation bilaterally; No wheeze, incision dressed  HEART: Regular rate and rhythm; No murmurs, rubs, or gallops  ABDOMEN: Soft, Nontender, Nondistended; Bowel sounds present  NEURO: AAOx3, no focal weakness  EXTREMITIES:  2+ Peripheral Pulses, No clubbing, cyanosis, or edema

## 2021-09-15 NOTE — PROGRESS NOTE ADULT - ASSESSMENT
51 year old male with significant PMHx of bicuspid aortic valve as a child, HLD, HTN, vestibular schwannoma, acoustic neuroma s/p removal in 2012 with sensorineural hearing loss in the left ear, aortic insufficiency as a child, and known thoracic aortic dilation for 40 years    sp ascending aorta replacement and bio prosthetic aortic valve replacement9/2     s/p Aortic valve regurgitation s/p AVR(t), ascending tube graft on 9/13   Cont BB  Pain control  Bowel regimen   Incentive Spirometry  Monitor urine outpt s/p stevens  Follow up CXR  Care per CTsx team    Thrombocytopenia and Acute Blood Loss Anemia:  Monitor h/h & plts    HLD/HTN:  No active chest pain  Cont home CV meds  Cont to monitor    GI ppx:  Protonix     DVT ppx:  Lovenox subq  SCD    [facility]  [phone]         51 year old male with significant PMHx of bicuspid aortic valve as a child, HLD, HTN, vestibular schwannoma, acoustic neuroma s/p removal in 2012 with sensorineural hearing loss in the left ear, aortic insufficiency as a child, and known thoracic aortic dilation for 40 years    sp ascending aorta replacement and bio prosthetic aortic valve replacement 9/2     s/p Aortic valve regurgitation s/p AVR(t), ascending tube graft on 9/13   Cont BB  started lasix aldactone  asa  Pain control  Bowel regimen   Incentive Spirometry  Care per CTsx team    Thrombocytopenia and Acute Blood Loss Anemia:  Monitor h/h & plts,s table    HLD/HTN:  Cont home CV meds  Cont to monitor    DVT ppx:  Lovenox subq  SCD    [facility]

## 2021-09-16 LAB
BASOPHILS # BLD AUTO: 0.02 K/UL — SIGNIFICANT CHANGE UP (ref 0–0.2)
BASOPHILS NFR BLD AUTO: 0.2 % — SIGNIFICANT CHANGE UP (ref 0–2)
EOSINOPHIL # BLD AUTO: 0.05 K/UL — SIGNIFICANT CHANGE UP (ref 0–0.5)
EOSINOPHIL NFR BLD AUTO: 0.5 % — SIGNIFICANT CHANGE UP (ref 0–6)
HCT VFR BLD CALC: 32.2 % — LOW (ref 39–50)
HGB BLD-MCNC: 10.6 G/DL — LOW (ref 13–17)
IMM GRANULOCYTES NFR BLD AUTO: 0.4 % — SIGNIFICANT CHANGE UP (ref 0–1.5)
LYMPHOCYTES # BLD AUTO: 2.32 K/UL — SIGNIFICANT CHANGE UP (ref 1–3.3)
LYMPHOCYTES # BLD AUTO: 25.1 % — SIGNIFICANT CHANGE UP (ref 13–44)
MAGNESIUM SERPL-MCNC: 2.1 MG/DL — SIGNIFICANT CHANGE UP (ref 1.6–2.6)
MCHC RBC-ENTMCNC: 30.8 PG — SIGNIFICANT CHANGE UP (ref 27–34)
MCHC RBC-ENTMCNC: 32.9 GM/DL — SIGNIFICANT CHANGE UP (ref 32–36)
MCV RBC AUTO: 93.6 FL — SIGNIFICANT CHANGE UP (ref 80–100)
MONOCYTES # BLD AUTO: 0.83 K/UL — SIGNIFICANT CHANGE UP (ref 0–0.9)
MONOCYTES NFR BLD AUTO: 9 % — SIGNIFICANT CHANGE UP (ref 2–14)
NEUTROPHILS # BLD AUTO: 5.99 K/UL — SIGNIFICANT CHANGE UP (ref 1.8–7.4)
NEUTROPHILS NFR BLD AUTO: 64.8 % — SIGNIFICANT CHANGE UP (ref 43–77)
NRBC # BLD: 0 /100 WBCS — SIGNIFICANT CHANGE UP (ref 0–0)
PHOSPHATE SERPL-MCNC: 2.1 MG/DL — LOW (ref 2.5–4.5)
PLATELET # BLD AUTO: 106 K/UL — LOW (ref 150–400)
RBC # BLD: 3.44 M/UL — LOW (ref 4.2–5.8)
RBC # FLD: 13.5 % — SIGNIFICANT CHANGE UP (ref 10.3–14.5)
WBC # BLD: 9.25 K/UL — SIGNIFICANT CHANGE UP (ref 3.8–10.5)
WBC # FLD AUTO: 9.25 K/UL — SIGNIFICANT CHANGE UP (ref 3.8–10.5)

## 2021-09-16 PROCEDURE — 71045 X-RAY EXAM CHEST 1 VIEW: CPT | Mod: 26

## 2021-09-16 PROCEDURE — 93010 ELECTROCARDIOGRAM REPORT: CPT

## 2021-09-16 RX ORDER — POTASSIUM BICARBONATE 978 MG/1
25 TABLET, EFFERVESCENT ORAL
Refills: 0 | Status: COMPLETED | OUTPATIENT
Start: 2021-09-16 | End: 2021-09-16

## 2021-09-16 RX ORDER — SORBITOL SOLUTION 70 %
30 SOLUTION, ORAL MISCELLANEOUS ONCE
Refills: 0 | Status: COMPLETED | OUTPATIENT
Start: 2021-09-16 | End: 2021-09-16

## 2021-09-16 RX ORDER — ENOXAPARIN SODIUM 100 MG/ML
40 INJECTION SUBCUTANEOUS
Refills: 0 | Status: DISCONTINUED | OUTPATIENT
Start: 2021-09-16 | End: 2021-09-19

## 2021-09-16 RX ORDER — SENNA PLUS 8.6 MG/1
1 TABLET ORAL AT BEDTIME
Refills: 0 | Status: DISCONTINUED | OUTPATIENT
Start: 2021-09-16 | End: 2021-09-19

## 2021-09-16 RX ORDER — MAGNESIUM SULFATE 500 MG/ML
2 VIAL (ML) INJECTION ONCE
Refills: 0 | Status: COMPLETED | OUTPATIENT
Start: 2021-09-16 | End: 2021-09-16

## 2021-09-16 RX ADMIN — Medication 50 MILLIGRAM(S): at 05:22

## 2021-09-16 RX ADMIN — AMIODARONE HYDROCHLORIDE 400 MILLIGRAM(S): 400 TABLET ORAL at 21:37

## 2021-09-16 RX ADMIN — ENOXAPARIN SODIUM 40 MILLIGRAM(S): 100 INJECTION SUBCUTANEOUS at 17:55

## 2021-09-16 RX ADMIN — Medication 20 MILLIGRAM(S): at 05:22

## 2021-09-16 RX ADMIN — OXYCODONE AND ACETAMINOPHEN 2 TABLET(S): 5; 325 TABLET ORAL at 06:26

## 2021-09-16 RX ADMIN — Medication 50 MILLIGRAM(S): at 21:37

## 2021-09-16 RX ADMIN — PANTOPRAZOLE SODIUM 40 MILLIGRAM(S): 20 TABLET, DELAYED RELEASE ORAL at 05:22

## 2021-09-16 RX ADMIN — OXYCODONE AND ACETAMINOPHEN 2 TABLET(S): 5; 325 TABLET ORAL at 20:20

## 2021-09-16 RX ADMIN — OXYCODONE AND ACETAMINOPHEN 2 TABLET(S): 5; 325 TABLET ORAL at 19:49

## 2021-09-16 RX ADMIN — Medication 30 MILLILITER(S): at 08:51

## 2021-09-16 RX ADMIN — Medication 50 MILLIGRAM(S): at 13:36

## 2021-09-16 RX ADMIN — OXYCODONE AND ACETAMINOPHEN 2 TABLET(S): 5; 325 TABLET ORAL at 13:33

## 2021-09-16 RX ADMIN — OXYCODONE AND ACETAMINOPHEN 2 TABLET(S): 5; 325 TABLET ORAL at 12:35

## 2021-09-16 RX ADMIN — OXYCODONE AND ACETAMINOPHEN 2 TABLET(S): 5; 325 TABLET ORAL at 06:56

## 2021-09-16 RX ADMIN — Medication 81 MILLIGRAM(S): at 13:36

## 2021-09-16 RX ADMIN — ENOXAPARIN SODIUM 40 MILLIGRAM(S): 100 INJECTION SUBCUTANEOUS at 13:36

## 2021-09-16 RX ADMIN — OXYCODONE AND ACETAMINOPHEN 2 TABLET(S): 5; 325 TABLET ORAL at 00:55

## 2021-09-16 RX ADMIN — AMIODARONE HYDROCHLORIDE 400 MILLIGRAM(S): 400 TABLET ORAL at 13:35

## 2021-09-16 RX ADMIN — AMIODARONE HYDROCHLORIDE 400 MILLIGRAM(S): 400 TABLET ORAL at 05:22

## 2021-09-16 RX ADMIN — SENNA PLUS 1 TABLET(S): 8.6 TABLET ORAL at 21:37

## 2021-09-16 RX ADMIN — Medication 50 GRAM(S): at 08:51

## 2021-09-16 RX ADMIN — POTASSIUM BICARBONATE 25 MILLIEQUIVALENT(S): 978 TABLET, EFFERVESCENT ORAL at 08:26

## 2021-09-16 RX ADMIN — ATORVASTATIN CALCIUM 40 MILLIGRAM(S): 80 TABLET, FILM COATED ORAL at 21:37

## 2021-09-16 RX ADMIN — POLYETHYLENE GLYCOL 3350 17 GRAM(S): 17 POWDER, FOR SOLUTION ORAL at 13:36

## 2021-09-16 RX ADMIN — OXYCODONE AND ACETAMINOPHEN 2 TABLET(S): 5; 325 TABLET ORAL at 00:26

## 2021-09-16 RX ADMIN — SPIRONOLACTONE 25 MILLIGRAM(S): 25 TABLET, FILM COATED ORAL at 05:22

## 2021-09-16 RX ADMIN — POTASSIUM BICARBONATE 25 MILLIEQUIVALENT(S): 978 TABLET, EFFERVESCENT ORAL at 09:23

## 2021-09-16 NOTE — PROGRESS NOTE ADULT - SUBJECTIVE AND OBJECTIVE BOX
SUBJECTIVE / OVERNIGHT EVENTS:      INCOMPLETE NOTE    --------------------------------------------------------------------------------------------  LABS:                        10.6   9.25  )-----------( 106      ( 16 Sep 2021 05:10 )             32.2     09-16    137  |  101  |  22  ----------------------------<  115<H>  3.8   |  26  |  0.87    Ca    9.2      16 Sep 2021 05:13  Phos  2.1     09-16  Mg     2.1     09-16    TPro  5.9<L>  /  Alb  3.6  /  TBili  1.0  /  DBili  x   /  AST  28  /  ALT  24  /  AlkPhos  54  09-16      CAPILLARY BLOOD GLUCOSE      POCT Blood Glucose.: 123 mg/dL (16 Sep 2021 07:47)  POCT Blood Glucose.: 132 mg/dL (15 Sep 2021 21:14)  POCT Blood Glucose.: 124 mg/dL (15 Sep 2021 16:51)  POCT Blood Glucose.: 132 mg/dL (15 Sep 2021 11:54)            RADIOLOGY & ADDITIONAL TESTS:    Imaging Personally Reviewed:  [x] YES  [ ] NO    Consultant(s) Notes Reviewed:  [x] YES  [ ] NO    MEDICATIONS  (STANDING):  aMIOdarone    Tablet   Oral   aMIOdarone    Tablet 400 milliGRAM(s) Oral every 8 hours  aspirin enteric coated 81 milliGRAM(s) Oral daily  atorvastatin 40 milliGRAM(s) Oral at bedtime  chlorhexidine 2% Cloths 1 Application(s) Topical <User Schedule>  dextrose 50% Injectable 50 milliLiter(s) IV Push every 15 minutes  dextrose 50% Injectable 25 milliLiter(s) IV Push every 15 minutes  enoxaparin Injectable 40 milliGRAM(s) SubCutaneous daily  furosemide    Tablet 20 milliGRAM(s) Oral daily  influenza   Vaccine 0.5 milliLiter(s) IntraMuscular once  insulin lispro (ADMELOG) corrective regimen sliding scale   SubCutaneous three times a day before meals  insulin lispro (ADMELOG) corrective regimen sliding scale   SubCutaneous at bedtime  metoprolol tartrate 50 milliGRAM(s) Oral every 8 hours  pantoprazole    Tablet 40 milliGRAM(s) Oral before breakfast  polyethylene glycol 3350 17 Gram(s) Oral daily  senna 1 Tablet(s) Oral at bedtime  sodium chloride 0.9%. 1000 milliLiter(s) (10 mL/Hr) IV Continuous <Continuous>  spironolactone 25 milliGRAM(s) Oral daily    MEDICATIONS  (PRN):  oxycodone    5 mG/acetaminophen 325 mG 1 Tablet(s) Oral every 4 hours PRN Mild Pain (1 - 3)  oxycodone    5 mG/acetaminophen 325 mG 2 Tablet(s) Oral every 6 hours PRN Moderate Pain (4 - 6)      Care Discussed with Consultants/Other Providers [x] YES  [ ] NO    Vital Signs Last 24 Hrs  T(C): 36.9 (16 Sep 2021 07:03), Max: 38.2 (15 Sep 2021 23:09)  T(F): 98.4 (16 Sep 2021 07:03), Max: 100.8 (15 Sep 2021 23:09)  HR: 80 (16 Sep 2021 07:03) (72 - 105)  BP: 123/76 (16 Sep 2021 07:03) (97/68 - 125/70)  BP(mean): 91 (16 Sep 2021 07:03) (79 - 91)  RR: 18 (16 Sep 2021 07:03) (18 - 18)  SpO2: 94% (16 Sep 2021 07:03) (91% - 97%)  I&O's Summary    15 Sep 2021 07:01  -  16 Sep 2021 07:00  --------------------------------------------------------  IN: 720 mL / OUT: 1700 mL / NET: -980 mL    16 Sep 2021 07:01  -  16 Sep 2021 11:11  --------------------------------------------------------  IN: 150 mL / OUT: 200 mL / NET: -50 mL      PHYSICAL EXAM:  GENERAL: NAD, well-developed, comfortable  HEAD:  Atraumatic, Normocephalic, right neck IJ  CHEST/LUNG: Clear to auscultation bilaterally; No wheeze, incision dressed  HEART: Regular rate and rhythm; No murmurs, rubs, or gallops  ABDOMEN: Soft, Nontender, Nondistended; Bowel sounds present  NEURO: AAOx3, no focal weakness  EXTREMITIES:  2+ Peripheral Pulses, No clubbing, cyanosis, or edema     SUBJECTIVE / OVERNIGHT EVENTS:      still some pain with deep inspiration. lines removed. ambulating. afebrile. overnight afib.    --------------------------------------------------------------------------------------------  LABS:                        10.6   9.25  )-----------( 106      ( 16 Sep 2021 05:10 )             32.2     09-16    137  |  101  |  22  ----------------------------<  115<H>  3.8   |  26  |  0.87    Ca    9.2      16 Sep 2021 05:13  Phos  2.1     09-16  Mg     2.1     09-16    TPro  5.9<L>  /  Alb  3.6  /  TBili  1.0  /  DBili  x   /  AST  28  /  ALT  24  /  AlkPhos  54  09-16      CAPILLARY BLOOD GLUCOSE      POCT Blood Glucose.: 123 mg/dL (16 Sep 2021 07:47)  POCT Blood Glucose.: 132 mg/dL (15 Sep 2021 21:14)  POCT Blood Glucose.: 124 mg/dL (15 Sep 2021 16:51)  POCT Blood Glucose.: 132 mg/dL (15 Sep 2021 11:54)            RADIOLOGY & ADDITIONAL TESTS:    Imaging Personally Reviewed:  [x] YES  [ ] NO    Consultant(s) Notes Reviewed:  [x] YES  [ ] NO    MEDICATIONS  (STANDING):  aMIOdarone    Tablet   Oral   aMIOdarone    Tablet 400 milliGRAM(s) Oral every 8 hours  aspirin enteric coated 81 milliGRAM(s) Oral daily  atorvastatin 40 milliGRAM(s) Oral at bedtime  chlorhexidine 2% Cloths 1 Application(s) Topical <User Schedule>  dextrose 50% Injectable 50 milliLiter(s) IV Push every 15 minutes  dextrose 50% Injectable 25 milliLiter(s) IV Push every 15 minutes  enoxaparin Injectable 40 milliGRAM(s) SubCutaneous daily  furosemide    Tablet 20 milliGRAM(s) Oral daily  influenza   Vaccine 0.5 milliLiter(s) IntraMuscular once  insulin lispro (ADMELOG) corrective regimen sliding scale   SubCutaneous three times a day before meals  insulin lispro (ADMELOG) corrective regimen sliding scale   SubCutaneous at bedtime  metoprolol tartrate 50 milliGRAM(s) Oral every 8 hours  pantoprazole    Tablet 40 milliGRAM(s) Oral before breakfast  polyethylene glycol 3350 17 Gram(s) Oral daily  senna 1 Tablet(s) Oral at bedtime  sodium chloride 0.9%. 1000 milliLiter(s) (10 mL/Hr) IV Continuous <Continuous>  spironolactone 25 milliGRAM(s) Oral daily    MEDICATIONS  (PRN):  oxycodone    5 mG/acetaminophen 325 mG 1 Tablet(s) Oral every 4 hours PRN Mild Pain (1 - 3)  oxycodone    5 mG/acetaminophen 325 mG 2 Tablet(s) Oral every 6 hours PRN Moderate Pain (4 - 6)      Care Discussed with Consultants/Other Providers [x] YES  [ ] NO    Vital Signs Last 24 Hrs  T(C): 36.9 (16 Sep 2021 07:03), Max: 38.2 (15 Sep 2021 23:09)  T(F): 98.4 (16 Sep 2021 07:03), Max: 100.8 (15 Sep 2021 23:09)  HR: 80 (16 Sep 2021 07:03) (72 - 105)  BP: 123/76 (16 Sep 2021 07:03) (97/68 - 125/70)  BP(mean): 91 (16 Sep 2021 07:03) (79 - 91)  RR: 18 (16 Sep 2021 07:03) (18 - 18)  SpO2: 94% (16 Sep 2021 07:03) (91% - 97%)  I&O's Summary    15 Sep 2021 07:01  -  16 Sep 2021 07:00  --------------------------------------------------------  IN: 720 mL / OUT: 1700 mL / NET: -980 mL    16 Sep 2021 07:01  -  16 Sep 2021 11:11  --------------------------------------------------------  IN: 150 mL / OUT: 200 mL / NET: -50 mL      PHYSICAL EXAM:  GENERAL: NAD, well-developed, comfortable  HEAD:  Atraumatic, Normocephalic  CHEST/LUNG: Clear to auscultation bilaterally; No wheeze, incision dressed  HEART: Regular rate and rhythm; No murmurs, rubs, or gallops  ABDOMEN: Soft, Nontender, midly distended; Bowel sounds present  NEURO: AAOx3, no focal weakness  EXTREMITIES:  2+ Peripheral Pulses, No clubbing, cyanosis, or edema

## 2021-09-16 NOTE — PROGRESS NOTE ADULT - ASSESSMENT
51 year old male with significant PMHx of bicuspid aortic valve as a child, HLD, HTN, vestibular schwannoma, acoustic neuroma s/p removal in 2012 with sensorineural hearing loss in the left ear, aortic insufficiency as a child, and known thoracic aortic dilation for 40 years presents for [facility] pre ascending aorta replacement and bio prosthetic aortic valve replacement planned on 9/2/2021. Patient with c/o fatigue for the last 3-4 months. Recent TTE on 11/2020 EF 60%, severe aortic regurgitation. Patient was referred for evaluation and management of aortic insufficiency and thoracic aortic dilation with Dr. Vogel. Presents to [facility] for cardiac surgery.   On 9/13/21 s/p AVR (T) # 23 (Perimount) / Ascending aorta repair with 34 mm gelweave graft. Intra op received FEIBA / 1 bag Platelet and use of cellsaver and cardiotomy suction  Post op Course:   Extubated POD # 0   Inotropic support required à On Dobutamine gtt weaned off.   Hypertension requiring Cardene gtt à weaned off.   Stress Hyperglycemia requiring insulin gtt à weaned off   9/14 Transferred to SDU overnight; MS lAber marcial SS   9/15 VVS; MS Alber drain D/C’d this AM.  Started on Lasix 20 mg PO daily and Aldactone 25 mg   PO daily.  Lopressor increased to 25 mg PO TID. Right IJ D/C’d.  Patient may transfer to the floor.     9/16 Recurrent afib this am, cont lopressor/ amio, mag , K  May need to increase lopressor

## 2021-09-16 NOTE — PROGRESS NOTE ADULT - SUBJECTIVE AND OBJECTIVE BOX
VITAL SIGNS    Telemetry:      Vital Signs Last 24 Hrs  T(C): 36.9 (21 @ 07:03), Max: 38.2 (09-15-21 @ 23:09)  T(F): 98.4 (21 @ 07:03), Max: 100.8 (09-15-21 @ 23:09)  HR: 80 (21 @ 07:03) (72 - 105)  BP: 123/76 (21 @ 07:03) (97/68 - 125/70)  RR: 18 (21 @ 07:03) (18 - 18)  SpO2: 94% (21 @ 07:03) (91% - 97%)                   09-15 @ 07:01  -   @ 07:00  --------------------------------------------------------  IN: 720 mL / OUT: 1700 mL / NET: -980 mL          Daily     Daily Weight in k.3 (15 Sep 2021 08:41)            CAPILLARY BLOOD GLUCOSE      POCT Blood Glucose.: 123 mg/dL (16 Sep 2021 07:47)  POCT Blood Glucose.: 132 mg/dL (15 Sep 2021 21:14)  POCT Blood Glucose.: 124 mg/dL (15 Sep 2021 16:51)  POCT Blood Glucose.: 132 mg/dL (15 Sep 2021 11:54)            Drains:     MS         [  ] Drainage:                 L Pleural  [  ]  Drainage:                R Pleural  [  ]  Drainage:    Pacing Wires        [  ]   Settings:                                  Isolated  [  ]    Coumadin    [ ] YES          [  ]      NO                                   PHYSICAL EXAM        Neurology: alert and oriented x 3, nonfocal, no gross deficits  CV : s1 s2 RRR  Sternal Wound :  CDI , Stable  Lungs: cta  Abdomen: soft, nontender, nondistended, positive bowel sounds, last bowel movement                       chest tubes  :    voiding / stevens - sbd         Extremities:      edema   /  -   calve tenderness ,    L leg  /  R leg  incisions cdi          aMIOdarone    Tablet   Oral   aMIOdarone    Tablet 400 milliGRAM(s) Oral every 8 hours  aspirin enteric coated 81 milliGRAM(s) Oral daily  atorvastatin 40 milliGRAM(s) Oral at bedtime  chlorhexidine 2% Cloths 1 Application(s) Topical <User Schedule>  dextrose 50% Injectable 50 milliLiter(s) IV Push every 15 minutes  dextrose 50% Injectable 25 milliLiter(s) IV Push every 15 minutes  enoxaparin Injectable 40 milliGRAM(s) SubCutaneous daily  furosemide    Tablet 20 milliGRAM(s) Oral daily  influenza   Vaccine 0.5 milliLiter(s) IntraMuscular once  insulin lispro (ADMELOG) corrective regimen sliding scale   SubCutaneous three times a day before meals  insulin lispro (ADMELOG) corrective regimen sliding scale   SubCutaneous at bedtime  magnesium sulfate  IVPB 2 Gram(s) IV Intermittent once  metoprolol tartrate 50 milliGRAM(s) Oral every 8 hours  oxycodone    5 mG/acetaminophen 325 mG 1 Tablet(s) Oral every 4 hours PRN  oxycodone    5 mG/acetaminophen 325 mG 2 Tablet(s) Oral every 6 hours PRN  pantoprazole    Tablet 40 milliGRAM(s) Oral before breakfast  polyethylene glycol 3350 17 Gram(s) Oral daily  potassium bicarbonate/potassium citrate 25 milliEquivalent(s) Oral every 1 hour  sodium chloride 0.9%. 1000 milliLiter(s) IV Continuous <Continuous>  spironolactone 25 milliGRAM(s) Oral daily                    Physical Therapy Rec:   Home  [  ]   Home w/ PT  [  ]  Rehab  [  ]  Discussed with Cardiothoracic Team at AM rounds. im ok    VITAL SIGNS    Telemetry:  nsr    Vital Signs Last 24 Hrs  T(C): 36.9 (21 @ 07:03), Max: 38.2 (09-15-21 @ 23:09)  T(F): 98.4 (21 @ 07:03), Max: 100.8 (09-15-21 @ 23:09)  HR: 80 (21 @ 07:03) (72 - 105)  BP: 123/76 (21 @ 07:03) (97/68 - 125/70)  RR: 18 (21 @ 07:03) (18 - 18)  SpO2: 94% (21 @ 07:03) (91% - 97%)                   09-15 @ 07:01  -   @ 07:00  --------------------------------------------------------  IN: 720 mL / OUT: 1700 mL / NET: -980 mL          Daily     Daily Weight in k.3 (15 Sep 2021 08:41)            CAPILLARY BLOOD GLUCOSE      POCT Blood Glucose.: 123 mg/dL (16 Sep 2021 07:47)  POCT Blood Glucose.: 132 mg/dL (15 Sep 2021 21:14)  POCT Blood Glucose.: 124 mg/dL (15 Sep 2021 16:51)  POCT Blood Glucose.: 132 mg/dL (15 Sep 2021 11:54)                Pacing Wires        [x  ]   Settings:          vvi                        Isolated  [  ]    Coumadin    [ ] YES          [ x ]      NO                                   PHYSICAL EXAM        Neurology: alert and oriented x 3, nonfocal, no gross deficits  CV : s1 s2 RRR  Sternal Wound :  CDI , Stable  Lungs: cta  Abdomen: soft, nontender, nondistended, positive bowel sounds, last bowel movement neg                       :    voiding    Extremities:      -edema   /  -   calve tenderness ,              aMIOdarone    Tablet   Oral   aMIOdarone    Tablet 400 milliGRAM(s) Oral every 8 hours  aspirin enteric coated 81 milliGRAM(s) Oral daily  atorvastatin 40 milliGRAM(s) Oral at bedtime  chlorhexidine 2% Cloths 1 Application(s) Topical <User Schedule>  dextrose 50% Injectable 50 milliLiter(s) IV Push every 15 minutes  dextrose 50% Injectable 25 milliLiter(s) IV Push every 15 minutes  enoxaparin Injectable 40 milliGRAM(s) SubCutaneous daily  furosemide    Tablet 20 milliGRAM(s) Oral daily  influenza   Vaccine 0.5 milliLiter(s) IntraMuscular once  insulin lispro (ADMELOG) corrective regimen sliding scale   SubCutaneous three times a day before meals  insulin lispro (ADMELOG) corrective regimen sliding scale   SubCutaneous at bedtime  magnesium sulfate  IVPB 2 Gram(s) IV Intermittent once  metoprolol tartrate 50 milliGRAM(s) Oral every 8 hours  oxycodone    5 mG/acetaminophen 325 mG 1 Tablet(s) Oral every 4 hours PRN  oxycodone    5 mG/acetaminophen 325 mG 2 Tablet(s) Oral every 6 hours PRN  pantoprazole    Tablet 40 milliGRAM(s) Oral before breakfast  polyethylene glycol 3350 17 Gram(s) Oral daily  potassium bicarbonate/potassium citrate 25 milliEquivalent(s) Oral every 1 hour  sodium chloride 0.9%. 1000 milliLiter(s) IV Continuous <Continuous>  spironolactone 25 milliGRAM(s) Oral daily                    Physical Therapy Rec:   Home  [  ]   Home w/ PT  [  ]  Rehab  [  ]  Discussed with Cardiothoracic Team at AM rounds.

## 2021-09-16 NOTE — PROGRESS NOTE ADULT - ASSESSMENT
51 year old male with significant PMHx of bicuspid aortic valve as a child, HLD, HTN, vestibular schwannoma, acoustic neuroma s/p removal in 2012 with sensorineural hearing loss in the left ear, aortic insufficiency as a child, and known thoracic aortic dilation for 40 years    sp ascending aorta replacement and bio prosthetic aortic valve replacement 9/2     s/p Aortic valve regurgitation s/p AVR(t), ascending tube graft on 9/13   Cont BB  started lasix aldactone  asa  Pain control  Bowel regimen   Incentive Spirometry  Care per CTsx team    Thrombocytopenia and Acute Blood Loss Anemia:  Monitor h/h & plts,s table    HLD/HTN:  Cont home CV meds  Cont to monitor    DVT ppx:  Lovenox subq  SCD    [facility]       51 year old male with significant PMHx of bicuspid aortic valve as a child, HLD, HTN, vestibular schwannoma, acoustic neuroma s/p removal in 2012 with sensorineural hearing loss in the left ear, aortic insufficiency as a child, and known thoracic aortic dilation for 40 years    sp ascending aorta replacement and bio prosthetic aortic valve replacement 9/2     # s/p Aortic valve regurgitation s/p AVR(t), ascending tube graft on 9/13   # HLD/HTN  # pafib  uptitrating lopressor  lasix aldactone  amio  asa  Pain control  Bowel regimen   Incentive Spirometry  Care per CTsx team    # Thrombocytopenia and Acute Blood Loss Anemia:  h/h stable    DVT ppx:  Lovenox subq    [facility]  [phone]

## 2021-09-17 LAB
HCT VFR BLD CALC: 33.2 % — LOW (ref 39–50)
HGB BLD-MCNC: 11 G/DL — LOW (ref 13–17)
MAGNESIUM SERPL-MCNC: 2.3 MG/DL — SIGNIFICANT CHANGE UP (ref 1.6–2.6)
MCHC RBC-ENTMCNC: 30.6 PG — SIGNIFICANT CHANGE UP (ref 27–34)
MCHC RBC-ENTMCNC: 33.1 GM/DL — SIGNIFICANT CHANGE UP (ref 32–36)
MCV RBC AUTO: 92.5 FL — SIGNIFICANT CHANGE UP (ref 80–100)
NRBC # BLD: 0 /100 WBCS — SIGNIFICANT CHANGE UP (ref 0–0)
PHOSPHATE SERPL-MCNC: 2.9 MG/DL — SIGNIFICANT CHANGE UP (ref 2.5–4.5)
PLATELET # BLD AUTO: 119 K/UL — LOW (ref 150–400)
RBC # BLD: 3.59 M/UL — LOW (ref 4.2–5.8)
RBC # FLD: 13.2 % — SIGNIFICANT CHANGE UP (ref 10.3–14.5)
WBC # BLD: 8.38 K/UL — SIGNIFICANT CHANGE UP (ref 3.8–10.5)
WBC # FLD AUTO: 8.38 K/UL — SIGNIFICANT CHANGE UP (ref 3.8–10.5)

## 2021-09-17 RX ADMIN — Medication 20 MILLIGRAM(S): at 05:32

## 2021-09-17 RX ADMIN — CHLORHEXIDINE GLUCONATE 1 APPLICATION(S): 213 SOLUTION TOPICAL at 07:17

## 2021-09-17 RX ADMIN — OXYCODONE AND ACETAMINOPHEN 1 TABLET(S): 5; 325 TABLET ORAL at 05:32

## 2021-09-17 RX ADMIN — Medication 50 MILLIGRAM(S): at 21:04

## 2021-09-17 RX ADMIN — OXYCODONE AND ACETAMINOPHEN 1 TABLET(S): 5; 325 TABLET ORAL at 13:10

## 2021-09-17 RX ADMIN — ENOXAPARIN SODIUM 40 MILLIGRAM(S): 100 INJECTION SUBCUTANEOUS at 17:21

## 2021-09-17 RX ADMIN — SENNA PLUS 1 TABLET(S): 8.6 TABLET ORAL at 21:05

## 2021-09-17 RX ADMIN — ENOXAPARIN SODIUM 40 MILLIGRAM(S): 100 INJECTION SUBCUTANEOUS at 05:33

## 2021-09-17 RX ADMIN — SPIRONOLACTONE 25 MILLIGRAM(S): 25 TABLET, FILM COATED ORAL at 05:32

## 2021-09-17 RX ADMIN — Medication 50 MILLIGRAM(S): at 13:10

## 2021-09-17 RX ADMIN — AMIODARONE HYDROCHLORIDE 400 MILLIGRAM(S): 400 TABLET ORAL at 13:10

## 2021-09-17 RX ADMIN — Medication 81 MILLIGRAM(S): at 11:07

## 2021-09-17 RX ADMIN — PANTOPRAZOLE SODIUM 40 MILLIGRAM(S): 20 TABLET, DELAYED RELEASE ORAL at 05:31

## 2021-09-17 RX ADMIN — OXYCODONE AND ACETAMINOPHEN 1 TABLET(S): 5; 325 TABLET ORAL at 17:18

## 2021-09-17 RX ADMIN — OXYCODONE AND ACETAMINOPHEN 1 TABLET(S): 5; 325 TABLET ORAL at 13:40

## 2021-09-17 RX ADMIN — ATORVASTATIN CALCIUM 40 MILLIGRAM(S): 80 TABLET, FILM COATED ORAL at 21:05

## 2021-09-17 RX ADMIN — OXYCODONE AND ACETAMINOPHEN 1 TABLET(S): 5; 325 TABLET ORAL at 23:21

## 2021-09-17 RX ADMIN — OXYCODONE AND ACETAMINOPHEN 1 TABLET(S): 5; 325 TABLET ORAL at 06:00

## 2021-09-17 RX ADMIN — Medication 50 MILLIGRAM(S): at 05:32

## 2021-09-17 RX ADMIN — AMIODARONE HYDROCHLORIDE 400 MILLIGRAM(S): 400 TABLET ORAL at 05:32

## 2021-09-17 RX ADMIN — AMIODARONE HYDROCHLORIDE 400 MILLIGRAM(S): 400 TABLET ORAL at 21:03

## 2021-09-17 RX ADMIN — OXYCODONE AND ACETAMINOPHEN 1 TABLET(S): 5; 325 TABLET ORAL at 17:48

## 2021-09-17 NOTE — PROGRESS NOTE ADULT - SUBJECTIVE AND OBJECTIVE BOX
SUBJECTIVE / OVERNIGHT EVENTS:    INCOMPLETE NOTE      --------------------------------------------------------------------------------------------  LABS:                        11.0   8.38  )-----------( 119      ( 17 Sep 2021 05:00 )             33.2     09-17    138  |  101  |  22  ----------------------------<  108<H>  4.5   |  25  |  0.94    Ca    9.1      17 Sep 2021 05:01  Phos  2.9     09-17  Mg     2.3     09-17    TPro  6.7  /  Alb  4.1  /  TBili  1.1  /  DBili  x   /  AST  27  /  ALT  27  /  AlkPhos  63  09-17      CAPILLARY BLOOD GLUCOSE                RADIOLOGY & ADDITIONAL TESTS:    Imaging Personally Reviewed:  [x] YES  [ ] NO    Consultant(s) Notes Reviewed:  [x] YES  [ ] NO    MEDICATIONS  (STANDING):  aMIOdarone    Tablet   Oral   aMIOdarone    Tablet 400 milliGRAM(s) Oral every 8 hours  aspirin enteric coated 81 milliGRAM(s) Oral daily  atorvastatin 40 milliGRAM(s) Oral at bedtime  chlorhexidine 2% Cloths 1 Application(s) Topical <User Schedule>  dextrose 50% Injectable 50 milliLiter(s) IV Push every 15 minutes  dextrose 50% Injectable 25 milliLiter(s) IV Push every 15 minutes  enoxaparin Injectable 40 milliGRAM(s) SubCutaneous two times a day  furosemide    Tablet 20 milliGRAM(s) Oral daily  influenza   Vaccine 0.5 milliLiter(s) IntraMuscular once  metoprolol tartrate 50 milliGRAM(s) Oral every 8 hours  pantoprazole    Tablet 40 milliGRAM(s) Oral before breakfast  polyethylene glycol 3350 17 Gram(s) Oral daily  senna 1 Tablet(s) Oral at bedtime  sodium chloride 0.9%. 1000 milliLiter(s) (10 mL/Hr) IV Continuous <Continuous>  spironolactone 25 milliGRAM(s) Oral daily    MEDICATIONS  (PRN):  oxycodone    5 mG/acetaminophen 325 mG 1 Tablet(s) Oral every 4 hours PRN Mild Pain (1 - 3)  oxycodone    5 mG/acetaminophen 325 mG 2 Tablet(s) Oral every 6 hours PRN Moderate Pain (4 - 6)      Care Discussed with Consultants/Other Providers [x] YES  [ ] NO    Vital Signs Last 24 Hrs  T(C): 36.3 (17 Sep 2021 07:05), Max: 36.9 (16 Sep 2021 23:00)  T(F): 97.3 (17 Sep 2021 07:05), Max: 98.5 (16 Sep 2021 23:00)  HR: 65 (17 Sep 2021 11:10) (63 - 74)  BP: 125/69 (17 Sep 2021 11:10) (96/66 - 127/78)  BP(mean): 90 (17 Sep 2021 11:10) (76 - 97)  RR: 18 (17 Sep 2021 11:10) (18 - 18)  SpO2: 96% (17 Sep 2021 11:10) (90% - 96%)  I&O's Summary    16 Sep 2021 07:01  -  17 Sep 2021 07:00  --------------------------------------------------------  IN: 840 mL / OUT: 1350 mL / NET: -510 mL    17 Sep 2021 07:01  -  17 Sep 2021 13:59  --------------------------------------------------------  IN: 560 mL / OUT: 900 mL / NET: -340 mL      PHYSICAL EXAM:  GENERAL: NAD, well-developed, comfortable  HEAD:  Atraumatic, Normocephalic  CHEST/LUNG: Clear to auscultation bilaterally; No wheeze, incision dressed  HEART: Regular rate and rhythm; No murmurs, rubs, or gallops  ABDOMEN: Soft, Nontender, midly distended; Bowel sounds present  NEURO: AAOx3, no focal weakness  EXTREMITIES:  2+ Peripheral Pulses, No clubbing, cyanosis, or edema     SUBJECTIVE / OVERNIGHT EVENTS:    feels well. ambulating. good appetite had bm.       --------------------------------------------------------------------------------------------  LABS:                        11.0   8.38  )-----------( 119      ( 17 Sep 2021 05:00 )             33.2     09-17    138  |  101  |  22  ----------------------------<  108<H>  4.5   |  25  |  0.94    Ca    9.1      17 Sep 2021 05:01  Phos  2.9     09-17  Mg     2.3     09-17    TPro  6.7  /  Alb  4.1  /  TBili  1.1  /  DBili  x   /  AST  27  /  ALT  27  /  AlkPhos  63  09-17      CAPILLARY BLOOD GLUCOSE                RADIOLOGY & ADDITIONAL TESTS:    Imaging Personally Reviewed:  [x] YES  [ ] NO    Consultant(s) Notes Reviewed:  [x] YES  [ ] NO    MEDICATIONS  (STANDING):  aMIOdarone    Tablet   Oral   aMIOdarone    Tablet 400 milliGRAM(s) Oral every 8 hours  aspirin enteric coated 81 milliGRAM(s) Oral daily  atorvastatin 40 milliGRAM(s) Oral at bedtime  chlorhexidine 2% Cloths 1 Application(s) Topical <User Schedule>  dextrose 50% Injectable 50 milliLiter(s) IV Push every 15 minutes  dextrose 50% Injectable 25 milliLiter(s) IV Push every 15 minutes  enoxaparin Injectable 40 milliGRAM(s) SubCutaneous two times a day  furosemide    Tablet 20 milliGRAM(s) Oral daily  influenza   Vaccine 0.5 milliLiter(s) IntraMuscular once  metoprolol tartrate 50 milliGRAM(s) Oral every 8 hours  pantoprazole    Tablet 40 milliGRAM(s) Oral before breakfast  polyethylene glycol 3350 17 Gram(s) Oral daily  senna 1 Tablet(s) Oral at bedtime  sodium chloride 0.9%. 1000 milliLiter(s) (10 mL/Hr) IV Continuous <Continuous>  spironolactone 25 milliGRAM(s) Oral daily    MEDICATIONS  (PRN):  oxycodone    5 mG/acetaminophen 325 mG 1 Tablet(s) Oral every 4 hours PRN Mild Pain (1 - 3)  oxycodone    5 mG/acetaminophen 325 mG 2 Tablet(s) Oral every 6 hours PRN Moderate Pain (4 - 6)      Care Discussed with Consultants/Other Providers [x] YES  [ ] NO    Vital Signs Last 24 Hrs  T(C): 36.3 (17 Sep 2021 07:05), Max: 36.9 (16 Sep 2021 23:00)  T(F): 97.3 (17 Sep 2021 07:05), Max: 98.5 (16 Sep 2021 23:00)  HR: 65 (17 Sep 2021 11:10) (63 - 74)  BP: 125/69 (17 Sep 2021 11:10) (96/66 - 127/78)  BP(mean): 90 (17 Sep 2021 11:10) (76 - 97)  RR: 18 (17 Sep 2021 11:10) (18 - 18)  SpO2: 96% (17 Sep 2021 11:10) (90% - 96%)  I&O's Summary    16 Sep 2021 07:01  -  17 Sep 2021 07:00  --------------------------------------------------------  IN: 840 mL / OUT: 1350 mL / NET: -510 mL    17 Sep 2021 07:01  -  17 Sep 2021 13:59  --------------------------------------------------------  IN: 560 mL / OUT: 900 mL / NET: -340 mL      PHYSICAL EXAM:  GENERAL: NAD, well-developed, comfortable  HEAD:  Atraumatic, Normocephalic  CHEST/LUNG: Clear to auscultation bilaterally; No wheeze, incision dressed  HEART: Regular rate and rhythm; No murmurs, rubs, or gallops  ABDOMEN: Soft, dressed; Bowel sounds present  NEURO: AAOx3, no focal weakness  EXTREMITIES:  2+ Peripheral Pulses, trace edema

## 2021-09-17 NOTE — PROGRESS NOTE ADULT - SUBJECTIVE AND OBJECTIVE BOX
VITAL SIGNS    Telemetry:  nsr    Vital Signs Last 24 Hrs  T(C): 36.2 (21 @ 03:34), Max: 36.9 (21 @ 23:00)  T(F): 97.1 (21 @ 03:34), Max: 98.5 (21 @ 23:00)  HR: 63 (21 @ 07:00) (63 - 77)  BP: 127/78 (21 @ 05:26) (95/68 - 127/78)  RR: 18 (21 @ 05:26) (18 - 18)  SpO2: 96% (21 @ 07:00) (90% - 96%)                    @ 07:01  -   @ 07:00  --------------------------------------------------------  IN: 840 mL / OUT: 1350 mL / NET: -510 mL     @ 07:01  -   @ 08:29  --------------------------------------------------------  IN: 0 mL / OUT: 300 mL / NET: -300 mL          Daily     Daily Weight in k.8 (16 Sep 2021 08:47)            CAPILLARY BLOOD GLUCOSE                    Pacing Wires        [x  ]   Settings:         vvi                         Isolated  [  ]    Coumadin    [ ] YES          [ x ]      NO                                   PHYSICAL EXAM        Neurology: alert and oriented x 3, nonfocal, no gross deficits  CV : s1 s2 RRR  Sternal Wound :  CDI , Stable  Lungs: cta  Abdomen: soft, nontender, nondistended, positive bowel sounds, last bowel movement +                       :    voiding        Extremities:   -   edema   /  -   calve tenderness ,            aMIOdarone    Tablet   Oral   aMIOdarone    Tablet 400 milliGRAM(s) Oral every 8 hours  aspirin enteric coated 81 milliGRAM(s) Oral daily  atorvastatin 40 milliGRAM(s) Oral at bedtime  chlorhexidine 2% Cloths 1 Application(s) Topical <User Schedule>  dextrose 50% Injectable 50 milliLiter(s) IV Push every 15 minutes  dextrose 50% Injectable 25 milliLiter(s) IV Push every 15 minutes  enoxaparin Injectable 40 milliGRAM(s) SubCutaneous two times a day  furosemide    Tablet 20 milliGRAM(s) Oral daily  influenza   Vaccine 0.5 milliLiter(s) IntraMuscular once  metoprolol tartrate 50 milliGRAM(s) Oral every 8 hours  oxycodone    5 mG/acetaminophen 325 mG 1 Tablet(s) Oral every 4 hours PRN  oxycodone    5 mG/acetaminophen 325 mG 2 Tablet(s) Oral every 6 hours PRN  pantoprazole    Tablet 40 milliGRAM(s) Oral before breakfast  polyethylene glycol 3350 17 Gram(s) Oral daily  senna 1 Tablet(s) Oral at bedtime  sodium chloride 0.9%. 1000 milliLiter(s) IV Continuous <Continuous>  spironolactone 25 milliGRAM(s) Oral daily                    Physical Therapy Rec:   Home  [  ]   Home w/ PT  [  ]  Rehab  [  ]  Discussed with Cardiothoracic Team at AM rounds.

## 2021-09-17 NOTE — PROGRESS NOTE ADULT - ASSESSMENT
51 year old male with significant PMHx of bicuspid aortic valve as a child, HLD, HTN, vestibular schwannoma, acoustic neuroma s/p removal in 2012 with sensorineural hearing loss in the left ear, aortic insufficiency as a child, and known thoracic aortic dilation for 40 years    sp ascending aorta replacement and bio prosthetic aortic valve replacement 9/2     # s/p Aortic valve regurgitation s/p AVR(t), ascending tube graft on 9/13   # HLD/HTN  # pafib  uptitrating lopressor  lasix aldactone  amio  asa  Pain control  Bowel regimen   Incentive Spirometry  Care per CTsx team    # Thrombocytopenia and Acute Blood Loss Anemia:  h/h stable    DVT ppx:  Lovenox subq    [facility]  [phone]     51 year old male with significant PMHx of bicuspid aortic valve as a child, HLD, HTN, vestibular schwannoma, acoustic neuroma s/p removal in 2012 with sensorineural hearing loss in the left ear, aortic insufficiency as a child, and known thoracic aortic dilation for 40 years    sp ascending aorta replacement and bio prosthetic aortic valve replacement 9/2     # s/p Aortic valve regurgitation s/p AVR(t), ascending tube graft on 9/13   # HLD/HTN  # pafib  lopressor  lasix aldactone  amio load  asa  Pain control  Bowel regimen   Incentive Spirometry  Care per CTsx team    # Thrombocytopenia and Acute Blood Loss Anemia  h/h stable    DVT ppx:  Lovenox subq    [facility]  [phone]

## 2021-09-17 NOTE — PROGRESS NOTE ADULT - ASSESSMENT
51 year old male with significant PMHx of bicuspid aortic valve as a child, HLD, HTN, vestibular schwannoma, acoustic neuroma s/p removal in 2012 with sensorineural hearing loss in the left ear, aortic insufficiency as a child, and known thoracic aortic dilation for 40 years presents for [facility] pre ascending aorta replacement and bio prosthetic aortic valve replacement planned on 9/2/2021. Patient with c/o fatigue for the last 3-4 months. Recent TTE on 11/2020 EF 60%, severe aortic regurgitation. Patient was referred for evaluation and management of aortic insufficiency and thoracic aortic dilation with Dr. Vogel. Presents to [facility] for cardiac surgery.   On 9/13/21 s/p AVR (T) # 23 (Perimount) / Ascending aorta repair with 34 mm gelweave graft. Intra op received FEIBA / 1 bag Platelet and use of cellsaver and cardiotomy suction  Post op Course:   Extubated POD # 0   Inotropic support required à On Dobutamine gtt weaned off.   Hypertension requiring Cardene gtt à weaned off.   Stress Hyperglycemia requiring insulin gtt à weaned off   9/14 Transferred to SDU overnight; MS Alber marcial SS   9/15 VVS; MS Campo drain D/C’d this AM.  Started on Lasix 20 mg PO daily and Aldactone 25 mg   PO daily.  Lopressor increased to 25 mg PO TID. Right IJ D/C’d.  Patient may transfer to the floor.     9/16 Recurrent afib this am, cont lopressor/ amio, mag , K  May need to increase lopressor  9/17  Pt nsr/sb overnight.  Lovenox at 40 bid for PAF.  Cont amio load.  May transfer to floor

## 2021-09-18 DIAGNOSIS — I48.0 PAROXYSMAL ATRIAL FIBRILLATION: ICD-10-CM

## 2021-09-18 DIAGNOSIS — F32.9 MAJOR DEPRESSIVE DISORDER, SINGLE EPISODE, UNSPECIFIED: ICD-10-CM

## 2021-09-18 LAB
ALBUMIN SERPL ELPH-MCNC: 4 G/DL — SIGNIFICANT CHANGE UP (ref 3.3–5)
ALP SERPL-CCNC: 62 U/L — SIGNIFICANT CHANGE UP (ref 40–120)
ALT FLD-CCNC: 25 U/L — SIGNIFICANT CHANGE UP (ref 10–45)
ANION GAP SERPL CALC-SCNC: 12 MMOL/L — SIGNIFICANT CHANGE UP (ref 5–17)
AST SERPL-CCNC: 20 U/L — SIGNIFICANT CHANGE UP (ref 10–40)
BASOPHILS # BLD AUTO: 0.03 K/UL — SIGNIFICANT CHANGE UP (ref 0–0.2)
BASOPHILS NFR BLD AUTO: 0.4 % — SIGNIFICANT CHANGE UP (ref 0–2)
BILIRUB SERPL-MCNC: 0.7 MG/DL — SIGNIFICANT CHANGE UP (ref 0.2–1.2)
BUN SERPL-MCNC: 21 MG/DL — SIGNIFICANT CHANGE UP (ref 7–23)
CALCIUM SERPL-MCNC: 9.6 MG/DL — SIGNIFICANT CHANGE UP (ref 8.4–10.5)
CHLORIDE SERPL-SCNC: 104 MMOL/L — SIGNIFICANT CHANGE UP (ref 96–108)
CO2 SERPL-SCNC: 25 MMOL/L — SIGNIFICANT CHANGE UP (ref 22–31)
CREAT SERPL-MCNC: 0.92 MG/DL — SIGNIFICANT CHANGE UP (ref 0.5–1.3)
EOSINOPHIL # BLD AUTO: 0.04 K/UL — SIGNIFICANT CHANGE UP (ref 0–0.5)
EOSINOPHIL NFR BLD AUTO: 0.6 % — SIGNIFICANT CHANGE UP (ref 0–6)
GLUCOSE SERPL-MCNC: 113 MG/DL — HIGH (ref 70–99)
HCT VFR BLD CALC: 30.3 % — LOW (ref 39–50)
HGB BLD-MCNC: 9.9 G/DL — LOW (ref 13–17)
IMM GRANULOCYTES NFR BLD AUTO: 0.4 % — SIGNIFICANT CHANGE UP (ref 0–1.5)
LYMPHOCYTES # BLD AUTO: 1.92 K/UL — SIGNIFICANT CHANGE UP (ref 1–3.3)
LYMPHOCYTES # BLD AUTO: 28.7 % — SIGNIFICANT CHANGE UP (ref 13–44)
MCHC RBC-ENTMCNC: 30.1 PG — SIGNIFICANT CHANGE UP (ref 27–34)
MCHC RBC-ENTMCNC: 32.7 GM/DL — SIGNIFICANT CHANGE UP (ref 32–36)
MCV RBC AUTO: 92.1 FL — SIGNIFICANT CHANGE UP (ref 80–100)
MONOCYTES # BLD AUTO: 0.82 K/UL — SIGNIFICANT CHANGE UP (ref 0–0.9)
MONOCYTES NFR BLD AUTO: 12.3 % — SIGNIFICANT CHANGE UP (ref 2–14)
NEUTROPHILS # BLD AUTO: 3.85 K/UL — SIGNIFICANT CHANGE UP (ref 1.8–7.4)
NEUTROPHILS NFR BLD AUTO: 57.6 % — SIGNIFICANT CHANGE UP (ref 43–77)
NRBC # BLD: 0 /100 WBCS — SIGNIFICANT CHANGE UP (ref 0–0)
PLATELET # BLD AUTO: 160 K/UL — SIGNIFICANT CHANGE UP (ref 150–400)
POTASSIUM SERPL-MCNC: 4.3 MMOL/L — SIGNIFICANT CHANGE UP (ref 3.5–5.3)
POTASSIUM SERPL-SCNC: 4.3 MMOL/L — SIGNIFICANT CHANGE UP (ref 3.5–5.3)
PROT SERPL-MCNC: 6.1 G/DL — SIGNIFICANT CHANGE UP (ref 6–8.3)
RBC # BLD: 3.29 M/UL — LOW (ref 4.2–5.8)
RBC # FLD: 13.2 % — SIGNIFICANT CHANGE UP (ref 10.3–14.5)
SODIUM SERPL-SCNC: 141 MMOL/L — SIGNIFICANT CHANGE UP (ref 135–145)
WBC # BLD: 6.69 K/UL — SIGNIFICANT CHANGE UP (ref 3.8–10.5)
WBC # FLD AUTO: 6.69 K/UL — SIGNIFICANT CHANGE UP (ref 3.8–10.5)

## 2021-09-18 RX ORDER — FLUOXETINE HCL 10 MG
40 CAPSULE ORAL AT BEDTIME
Refills: 0 | Status: DISCONTINUED | OUTPATIENT
Start: 2021-09-18 | End: 2021-09-19

## 2021-09-18 RX ADMIN — OXYCODONE AND ACETAMINOPHEN 1 TABLET(S): 5; 325 TABLET ORAL at 17:42

## 2021-09-18 RX ADMIN — SPIRONOLACTONE 25 MILLIGRAM(S): 25 TABLET, FILM COATED ORAL at 05:47

## 2021-09-18 RX ADMIN — Medication 50 MILLIGRAM(S): at 13:03

## 2021-09-18 RX ADMIN — PANTOPRAZOLE SODIUM 40 MILLIGRAM(S): 20 TABLET, DELAYED RELEASE ORAL at 05:47

## 2021-09-18 RX ADMIN — Medication 20 MILLIGRAM(S): at 05:47

## 2021-09-18 RX ADMIN — ATORVASTATIN CALCIUM 40 MILLIGRAM(S): 80 TABLET, FILM COATED ORAL at 21:34

## 2021-09-18 RX ADMIN — Medication 50 MILLIGRAM(S): at 05:47

## 2021-09-18 RX ADMIN — CHLORHEXIDINE GLUCONATE 1 APPLICATION(S): 213 SOLUTION TOPICAL at 05:42

## 2021-09-18 RX ADMIN — Medication 50 MILLIGRAM(S): at 21:35

## 2021-09-18 RX ADMIN — Medication 81 MILLIGRAM(S): at 13:03

## 2021-09-18 RX ADMIN — AMIODARONE HYDROCHLORIDE 400 MILLIGRAM(S): 400 TABLET ORAL at 13:03

## 2021-09-18 RX ADMIN — AMIODARONE HYDROCHLORIDE 400 MILLIGRAM(S): 400 TABLET ORAL at 05:47

## 2021-09-18 RX ADMIN — OXYCODONE AND ACETAMINOPHEN 2 TABLET(S): 5; 325 TABLET ORAL at 19:31

## 2021-09-18 RX ADMIN — OXYCODONE AND ACETAMINOPHEN 2 TABLET(S): 5; 325 TABLET ORAL at 02:07

## 2021-09-18 RX ADMIN — OXYCODONE AND ACETAMINOPHEN 1 TABLET(S): 5; 325 TABLET ORAL at 13:02

## 2021-09-18 RX ADMIN — OXYCODONE AND ACETAMINOPHEN 2 TABLET(S): 5; 325 TABLET ORAL at 20:15

## 2021-09-18 RX ADMIN — AMIODARONE HYDROCHLORIDE 400 MILLIGRAM(S): 400 TABLET ORAL at 21:34

## 2021-09-18 RX ADMIN — ENOXAPARIN SODIUM 40 MILLIGRAM(S): 100 INJECTION SUBCUTANEOUS at 17:13

## 2021-09-18 RX ADMIN — OXYCODONE AND ACETAMINOPHEN 1 TABLET(S): 5; 325 TABLET ORAL at 07:48

## 2021-09-18 RX ADMIN — OXYCODONE AND ACETAMINOPHEN 1 TABLET(S): 5; 325 TABLET ORAL at 00:21

## 2021-09-18 RX ADMIN — ENOXAPARIN SODIUM 40 MILLIGRAM(S): 100 INJECTION SUBCUTANEOUS at 05:47

## 2021-09-18 RX ADMIN — OXYCODONE AND ACETAMINOPHEN 2 TABLET(S): 5; 325 TABLET ORAL at 01:07

## 2021-09-18 RX ADMIN — OXYCODONE AND ACETAMINOPHEN 1 TABLET(S): 5; 325 TABLET ORAL at 17:12

## 2021-09-18 RX ADMIN — OXYCODONE AND ACETAMINOPHEN 1 TABLET(S): 5; 325 TABLET ORAL at 13:32

## 2021-09-18 RX ADMIN — OXYCODONE AND ACETAMINOPHEN 1 TABLET(S): 5; 325 TABLET ORAL at 08:18

## 2021-09-18 NOTE — PROGRESS NOTE ADULT - ASSESSMENT
51 year old male with significant PMHx of bicuspid aortic valve as a child, HLD, HTN, vestibular schwannoma, acoustic neuroma s/p removal in 2012 with sensorineural hearing loss in the left ear, aortic insufficiency as a child, and known thoracic aortic dilation for 40 years presents for [facility] pre ascending aorta replacement and bio prosthetic aortic valve replacement planned on 9/2/2021. Patient with c/o fatigue for the last 3-4 months. Recent TTE on 11/2020 EF 60%, severe aortic regurgitation. Patient was referred for evaluation and management of aortic insufficiency and thoracic aortic dilation with Dr. Vogel. Presents to [facility] for cardiac surgery.   On 9/13/21 s/p AVR (T) # 23 (Perimount) / Ascending aorta repair with 34 mm gelweave graft. Intra op received FEIBA / 1 bag Platelet and use of cellsaver and cardiotomy suction  Post op Course:   Extubated POD # 0   Inotropic support required à On Dobutamine gtt weaned off.   Hypertension requiring Cardene gtt à weaned off.   Stress Hyperglycemia requiring insulin gtt à weaned off   9/14 Transferred to SDU overnight; MS Campo à SS   9/15 VVS; MS Campo drain D/C’d this AM.  Started on Lasix 20 mg PO daily and Aldactone 25 mg   PO daily.  Lopressor increased to 25 mg PO TID. Right IJ D/C’d.  Patient may transfer to the floor.     9/16 Recurrent afib this am, cont lopressor/ amio, mag , K. May need to increase lopressor  9/17 Pt nsr/sb overnight. Lovenox at 40 bid for PAF.  Cont amio load.  May transfer to floor.   9/18 VVS; PW D/C'd.  Continue with current medication regimen.  Resumed home dose Paxil 40 mg PO at bedtime.    Disposition: Home PT in AM

## 2021-09-18 NOTE — PROGRESS NOTE ADULT - SUBJECTIVE AND OBJECTIVE BOX
VITAL SIGNS    Subjective: "I'm feeling good today" Denies CP, palpitation, SOB, REYNOLDS, HA, dizziness, N/V/D, fever or chills.  No acute event noted overnight.     Telemetry: NSR 60-70      Vital Signs Last 24 Hrs  T(C): 36.7 (21 @ 11:15), Max: 36.7 (21 @ 18:42)  T(F): 98 (21 @ 11:15), Max: 98.1 (21 @ 07:21)  HR: 58 (21 @ 11:15) (58 - 65)  BP: 132/56 (21 @ 11:15) (110/72 - 138/79)  RR: 18 (21 @ 11:15) (18 - 18)  SpO2: 97% (21 @ 11:15) (93% - 100%)            @ 07:01  -   @ 07:00  --------------------------------------------------------  IN: 870 mL / OUT: 2650 mL / NET: -1780 mL     @ 07:01  -   @ 13:34  --------------------------------------------------------  IN: 360 mL / OUT: 1250 mL / NET: -890 mL    Daily     Daily Weight in k (18 Sep 2021 07:27)    PHYSICAL EXAM    Neurology: alert and oriented x 3, nonfocal, no gross deficits    CV: (+) S1 and S2, No murmurs, rubs, gallops or clicks; PW x 4 --> EPM --> Off     Sternal Wound: MSI -->CDI , sternum stable    Lungs: CTA B/L     Abdomen: soft, nontender, nondistended, positive bowel sounds, (+) Flatus; (+) BM     :  Voiding               Extremities:  B/L LE (-) edema; negative calf tenderness; (+) 2 DP palpable        aMIOdarone  Tablet   Oral   aMIOdarone Tablet 400 milliGRAM(s) Oral every 8 hours  aspirin enteric coated 81 milliGRAM(s) Oral daily  atorvastatin 40 milliGRAM(s) Oral at bedtime  chlorhexidine 2% Cloths 1 Application(s) Topical <User Schedule>  dextrose 50% Injectable 50 milliLiter(s) IV Push every 15 minutes  dextrose 50% Injectable 25 milliLiter(s) IV Push every 15 minutes  enoxaparin Injectable 40 milliGRAM(s) SubCutaneous two times a day  FLUoxetine 40 milliGRAM(s) Oral at bedtime  furosemide Tablet 20 milliGRAM(s) Oral daily  influenza  Vaccine 0.5 milliLiter(s) IntraMuscular once  metoprolol tartrate 50 milliGRAM(s) Oral every 8 hours  oxycodone 5 mG/acetaminophen 325 mG 1 Tablet(s) Oral every 4 hours PRN  oxycodone 5 mG/acetaminophen 325 mG 2 Tablet(s) Oral every 6 hours PRN  pantoprazole Tablet 40 milliGRAM(s) Oral before breakfast  polyethylene glycol 3350 17 Gram(s) Oral daily  senna 1 Tablet(s) Oral at bedtime  sodium chloride 0.9%. 1000 milliLiter(s) IV Continuous <Continuous>  spironolactone 25 milliGRAM(s) Oral daily    Physical Therapy Rec:   Home  [  ]   Home w/ PT  [ X ]  Rehab  [  ]    Discussed with Cardiothoracic Team at AM rounds.

## 2021-09-19 ENCOUNTER — TRANSCRIPTION ENCOUNTER (OUTPATIENT)
Age: 51
End: 2021-09-19

## 2021-09-19 VITALS — WEIGHT: 173.28 LBS

## 2021-09-19 LAB
ALBUMIN SERPL ELPH-MCNC: 4.2 G/DL — SIGNIFICANT CHANGE UP (ref 3.3–5)
ALP SERPL-CCNC: 68 U/L — SIGNIFICANT CHANGE UP (ref 40–120)
ALT FLD-CCNC: 23 U/L — SIGNIFICANT CHANGE UP (ref 10–45)
ANION GAP SERPL CALC-SCNC: 13 MMOL/L — SIGNIFICANT CHANGE UP (ref 5–17)
AST SERPL-CCNC: 19 U/L — SIGNIFICANT CHANGE UP (ref 10–40)
BASOPHILS # BLD AUTO: 0.03 K/UL — SIGNIFICANT CHANGE UP (ref 0–0.2)
BASOPHILS NFR BLD AUTO: 0.5 % — SIGNIFICANT CHANGE UP (ref 0–2)
BILIRUB SERPL-MCNC: 0.5 MG/DL — SIGNIFICANT CHANGE UP (ref 0.2–1.2)
BUN SERPL-MCNC: 18 MG/DL — SIGNIFICANT CHANGE UP (ref 7–23)
CALCIUM SERPL-MCNC: 9.1 MG/DL — SIGNIFICANT CHANGE UP (ref 8.4–10.5)
CHLORIDE SERPL-SCNC: 102 MMOL/L — SIGNIFICANT CHANGE UP (ref 96–108)
CO2 SERPL-SCNC: 24 MMOL/L — SIGNIFICANT CHANGE UP (ref 22–31)
CREAT SERPL-MCNC: 0.88 MG/DL — SIGNIFICANT CHANGE UP (ref 0.5–1.3)
EOSINOPHIL # BLD AUTO: 0.06 K/UL — SIGNIFICANT CHANGE UP (ref 0–0.5)
EOSINOPHIL NFR BLD AUTO: 1 % — SIGNIFICANT CHANGE UP (ref 0–6)
GLUCOSE SERPL-MCNC: 109 MG/DL — HIGH (ref 70–99)
HCT VFR BLD CALC: 30.2 % — LOW (ref 39–50)
HGB BLD-MCNC: 10.1 G/DL — LOW (ref 13–17)
IMM GRANULOCYTES NFR BLD AUTO: 0.5 % — SIGNIFICANT CHANGE UP (ref 0–1.5)
LYMPHOCYTES # BLD AUTO: 1.42 K/UL — SIGNIFICANT CHANGE UP (ref 1–3.3)
LYMPHOCYTES # BLD AUTO: 23.1 % — SIGNIFICANT CHANGE UP (ref 13–44)
MAGNESIUM SERPL-MCNC: 2 MG/DL — SIGNIFICANT CHANGE UP (ref 1.6–2.6)
MCHC RBC-ENTMCNC: 30.5 PG — SIGNIFICANT CHANGE UP (ref 27–34)
MCHC RBC-ENTMCNC: 33.4 GM/DL — SIGNIFICANT CHANGE UP (ref 32–36)
MCV RBC AUTO: 91.2 FL — SIGNIFICANT CHANGE UP (ref 80–100)
MONOCYTES # BLD AUTO: 0.73 K/UL — SIGNIFICANT CHANGE UP (ref 0–0.9)
MONOCYTES NFR BLD AUTO: 11.9 % — SIGNIFICANT CHANGE UP (ref 2–14)
NEUTROPHILS # BLD AUTO: 3.87 K/UL — SIGNIFICANT CHANGE UP (ref 1.8–7.4)
NEUTROPHILS NFR BLD AUTO: 63 % — SIGNIFICANT CHANGE UP (ref 43–77)
NRBC # BLD: 0 /100 WBCS — SIGNIFICANT CHANGE UP (ref 0–0)
PHOSPHATE SERPL-MCNC: 3.5 MG/DL — SIGNIFICANT CHANGE UP (ref 2.5–4.5)
PLATELET # BLD AUTO: 193 K/UL — SIGNIFICANT CHANGE UP (ref 150–400)
POTASSIUM SERPL-MCNC: 3.9 MMOL/L — SIGNIFICANT CHANGE UP (ref 3.5–5.3)
POTASSIUM SERPL-SCNC: 3.9 MMOL/L — SIGNIFICANT CHANGE UP (ref 3.5–5.3)
PROT SERPL-MCNC: 6.3 G/DL — SIGNIFICANT CHANGE UP (ref 6–8.3)
RBC # BLD: 3.31 M/UL — LOW (ref 4.2–5.8)
RBC # FLD: 13.2 % — SIGNIFICANT CHANGE UP (ref 10.3–14.5)
SODIUM SERPL-SCNC: 139 MMOL/L — SIGNIFICANT CHANGE UP (ref 135–145)
WBC # BLD: 6.14 K/UL — SIGNIFICANT CHANGE UP (ref 3.8–10.5)
WBC # FLD AUTO: 6.14 K/UL — SIGNIFICANT CHANGE UP (ref 3.8–10.5)

## 2021-09-19 PROCEDURE — U0005: CPT

## 2021-09-19 PROCEDURE — 86891 AUTOLOGOUS BLOOD OP SALVAGE: CPT

## 2021-09-19 PROCEDURE — C1768: CPT

## 2021-09-19 PROCEDURE — 97116 GAIT TRAINING THERAPY: CPT

## 2021-09-19 PROCEDURE — 86900 BLOOD TYPING SEROLOGIC ABO: CPT

## 2021-09-19 PROCEDURE — 82435 ASSAY OF BLOOD CHLORIDE: CPT

## 2021-09-19 PROCEDURE — 86850 RBC ANTIBODY SCREEN: CPT

## 2021-09-19 PROCEDURE — C1751: CPT

## 2021-09-19 PROCEDURE — 86901 BLOOD TYPING SEROLOGIC RH(D): CPT

## 2021-09-19 PROCEDURE — 85014 HEMATOCRIT: CPT

## 2021-09-19 PROCEDURE — 86923 COMPATIBILITY TEST ELECTRIC: CPT

## 2021-09-19 PROCEDURE — 88304 TISSUE EXAM BY PATHOLOGIST: CPT

## 2021-09-19 PROCEDURE — 84295 ASSAY OF SERUM SODIUM: CPT

## 2021-09-19 PROCEDURE — 83605 ASSAY OF LACTIC ACID: CPT

## 2021-09-19 PROCEDURE — 82553 CREATINE MB FRACTION: CPT

## 2021-09-19 PROCEDURE — P9047: CPT

## 2021-09-19 PROCEDURE — 84484 ASSAY OF TROPONIN QUANT: CPT

## 2021-09-19 PROCEDURE — C1889: CPT

## 2021-09-19 PROCEDURE — 82550 ASSAY OF CK (CPK): CPT

## 2021-09-19 PROCEDURE — C1769: CPT

## 2021-09-19 PROCEDURE — 83036 HEMOGLOBIN GLYCOSYLATED A1C: CPT

## 2021-09-19 PROCEDURE — 85025 COMPLETE CBC W/AUTO DIFF WBC: CPT

## 2021-09-19 PROCEDURE — 85730 THROMBOPLASTIN TIME PARTIAL: CPT

## 2021-09-19 PROCEDURE — P9045: CPT

## 2021-09-19 PROCEDURE — 82803 BLOOD GASES ANY COMBINATION: CPT

## 2021-09-19 PROCEDURE — 93010 ELECTROCARDIOGRAM REPORT: CPT

## 2021-09-19 PROCEDURE — 97162 PT EVAL MOD COMPLEX 30 MIN: CPT

## 2021-09-19 PROCEDURE — 84132 ASSAY OF SERUM POTASSIUM: CPT

## 2021-09-19 PROCEDURE — 83735 ASSAY OF MAGNESIUM: CPT

## 2021-09-19 PROCEDURE — 88305 TISSUE EXAM BY PATHOLOGIST: CPT

## 2021-09-19 PROCEDURE — 94002 VENT MGMT INPAT INIT DAY: CPT

## 2021-09-19 PROCEDURE — 93005 ELECTROCARDIOGRAM TRACING: CPT

## 2021-09-19 PROCEDURE — 86965 POOLING BLOOD PLATELETS: CPT

## 2021-09-19 PROCEDURE — 85018 HEMOGLOBIN: CPT

## 2021-09-19 PROCEDURE — 80053 COMPREHEN METABOLIC PANEL: CPT

## 2021-09-19 PROCEDURE — P9012: CPT

## 2021-09-19 PROCEDURE — 82330 ASSAY OF CALCIUM: CPT

## 2021-09-19 PROCEDURE — 82962 GLUCOSE BLOOD TEST: CPT

## 2021-09-19 PROCEDURE — U0003: CPT

## 2021-09-19 PROCEDURE — 85610 PROTHROMBIN TIME: CPT

## 2021-09-19 PROCEDURE — 82947 ASSAY GLUCOSE BLOOD QUANT: CPT

## 2021-09-19 PROCEDURE — P9037: CPT

## 2021-09-19 PROCEDURE — 71045 X-RAY EXAM CHEST 1 VIEW: CPT

## 2021-09-19 PROCEDURE — 80048 BASIC METABOLIC PNL TOTAL CA: CPT

## 2021-09-19 PROCEDURE — 82565 ASSAY OF CREATININE: CPT

## 2021-09-19 PROCEDURE — 85384 FIBRINOGEN ACTIVITY: CPT

## 2021-09-19 PROCEDURE — 84100 ASSAY OF PHOSPHORUS: CPT

## 2021-09-19 RX ORDER — FERROUS SULFATE 325(65) MG
1 TABLET ORAL
Qty: 0 | Refills: 0 | DISCHARGE

## 2021-09-19 RX ORDER — QUINAPRIL HYDROCHLORIDE 40 MG/1
1 TABLET, FILM COATED ORAL
Qty: 0 | Refills: 0 | DISCHARGE

## 2021-09-19 RX ORDER — METOPROLOL TARTRATE 50 MG
1 TABLET ORAL
Qty: 60 | Refills: 0
Start: 2021-09-19 | End: 2021-10-18

## 2021-09-19 RX ORDER — ROSUVASTATIN CALCIUM 5 MG/1
1 TABLET ORAL
Qty: 0 | Refills: 0 | DISCHARGE

## 2021-09-19 RX ORDER — FLUOXETINE HCL 10 MG
1 CAPSULE ORAL
Qty: 0 | Refills: 0 | DISCHARGE

## 2021-09-19 RX ORDER — POLYETHYLENE GLYCOL 3350 17 G/17G
17 POWDER, FOR SOLUTION ORAL
Qty: 0 | Refills: 0 | DISCHARGE
Start: 2021-09-19

## 2021-09-19 RX ORDER — PANTOPRAZOLE SODIUM 20 MG/1
1 TABLET, DELAYED RELEASE ORAL
Qty: 0 | Refills: 0 | DISCHARGE
Start: 2021-09-19

## 2021-09-19 RX ORDER — METOPROLOL TARTRATE 50 MG
25 TABLET ORAL
Refills: 0 | Status: DISCONTINUED | OUTPATIENT
Start: 2021-09-19 | End: 2021-09-19

## 2021-09-19 RX ADMIN — OXYCODONE AND ACETAMINOPHEN 1 TABLET(S): 5; 325 TABLET ORAL at 08:30

## 2021-09-19 RX ADMIN — ENOXAPARIN SODIUM 40 MILLIGRAM(S): 100 INJECTION SUBCUTANEOUS at 05:33

## 2021-09-19 RX ADMIN — SPIRONOLACTONE 25 MILLIGRAM(S): 25 TABLET, FILM COATED ORAL at 05:32

## 2021-09-19 RX ADMIN — OXYCODONE AND ACETAMINOPHEN 1 TABLET(S): 5; 325 TABLET ORAL at 08:02

## 2021-09-19 RX ADMIN — Medication 40 MILLIGRAM(S): at 05:30

## 2021-09-19 RX ADMIN — AMIODARONE HYDROCHLORIDE 400 MILLIGRAM(S): 400 TABLET ORAL at 12:09

## 2021-09-19 RX ADMIN — Medication 20 MILLIGRAM(S): at 05:32

## 2021-09-19 RX ADMIN — Medication 50 MILLIGRAM(S): at 05:31

## 2021-09-19 RX ADMIN — Medication 81 MILLIGRAM(S): at 11:08

## 2021-09-19 RX ADMIN — PANTOPRAZOLE SODIUM 40 MILLIGRAM(S): 20 TABLET, DELAYED RELEASE ORAL at 05:31

## 2021-09-19 RX ADMIN — AMIODARONE HYDROCHLORIDE 400 MILLIGRAM(S): 400 TABLET ORAL at 05:32

## 2021-09-19 RX ADMIN — CHLORHEXIDINE GLUCONATE 1 APPLICATION(S): 213 SOLUTION TOPICAL at 08:04

## 2021-09-19 RX ADMIN — OXYCODONE AND ACETAMINOPHEN 2 TABLET(S): 5; 325 TABLET ORAL at 05:28

## 2021-09-19 RX ADMIN — OXYCODONE AND ACETAMINOPHEN 1 TABLET(S): 5; 325 TABLET ORAL at 01:20

## 2021-09-19 RX ADMIN — OXYCODONE AND ACETAMINOPHEN 1 TABLET(S): 5; 325 TABLET ORAL at 00:47

## 2021-09-19 RX ADMIN — OXYCODONE AND ACETAMINOPHEN 2 TABLET(S): 5; 325 TABLET ORAL at 06:30

## 2021-09-19 NOTE — PROGRESS NOTE ADULT - PROVIDER SPECIALTY LIST ADULT
CT Surgery
Internal Medicine
Critical Care
Internal Medicine
Internal Medicine
Critical Care
Internal Medicine
CT Surgery

## 2021-09-19 NOTE — PROGRESS NOTE ADULT - PROBLEM SELECTOR PLAN 1
Continue with ASA 81 mg PO Daily.   Continue with Lopressor 25 mg PO TID; Titrate upward as tolerated for a HR goal 70-80.   Continue with Lipitor 40 mg PO HS    Increase activity as tolerated.   CXR in AM  Isolated PW   D/C Right IJ   Encourage Chest PT / Pulmonary toileting and Incentive spirometry every 1 hour x 10 while awake.   Continue with PUD and DVT prophylaxis.   Shower on POD #5.   May transfer to the floor   D/C plan home once medically cleared   Plan of care discussed with attending
Continue with ASA 81 mg PO Daily.   Continue with Lopressor 50 mg PO TID; Titrate upward as tolerated for a HR goal 70-80.   Continue with Lipitor 40 mg PO HS    Increase activity as tolerated.   PW D/C'd   Encourage Chest PT / Pulmonary toileting and Incentive spirometry every 1 hour x 10 while awake.   Continue with PUD and DVT prophylaxis.   Shower on POD #5.   May transfer to the floor   D/C plan home in AM  Plan of care discussed with attending
Continue with ASA 81 mg PO Daily.   Continue with Lopressor 50 mg PO TID; Titrate upward as tolerated for a HR goal 70-80.     D/C plan home   Plan of care discussed with attending

## 2021-09-19 NOTE — DISCHARGE NOTE PROVIDER - CARE PROVIDER_API CALL
Salbador Vogel (MD)  Surgery; Surgical Critical Care; Thoracic and Cardiac Surgery  [address] [place] [address]  Phone: [phone]  Fax: [phone]  Follow Up Time:

## 2021-09-19 NOTE — DISCHARGE NOTE PROVIDER - HOSPITAL COURSE
Assessment and Plan:   · Assessment    51 year old male with significant PMHx of bicuspid aortic valve as a child, HLD, HTN, vestibular schwannoma, acoustic neuroma s/p removal in 2012 with sensorineural hearing loss in the left ear, aortic insufficiency as a child, and known thoracic aortic dilation for 40 years presents for [facility] pre ascending aorta replacement and bio prosthetic aortic valve replacement planned on 9/2/2021. Patient with c/o fatigue for the last 3-4 months. Recent TTE on 11/2020 EF 60%, severe aortic regurgitation. Patient was referred for evaluation and management of aortic insufficiency and thoracic aortic dilation with Dr. Vogel. Presents to [facility] for cardiac surgery.   On 9/13/21 s/p AVR (T) # 23 (Perimount) / Ascending aorta repair with 34 mm gelweave graft. Intra op received FEIBA / 1 bag Platelet and use of cellsaver and cardiotomy suction  Post op Course:   Extubated POD # 0   Inotropic support required à On Dobutamine gtt weaned off.   Hypertension requiring Cardene gtt à weaned off.   Stress Hyperglycemia requiring insulin gtt à weaned off   9/14 Transferred to SDU overnight; MS Campo à SS   9/15 VVS; MS Campo drain D/C’d this AM.  Started on Lasix 20 mg PO daily and Aldactone 25 mg   PO daily.  Lopressor increased to 25 mg PO TID. Right IJ D/C’d.  Patient may transfer to the floor.     9/16 Recurrent afib this am, cont lopressor/ amio, mag , K. May need to increase lopressor  9/17 Pt nsr/sb overnight. Lovenox at 40 bid for PAF.  Cont amio load.  May transfer to floor.   9/18 VVS; PW D/C'd.  Continue with current medication regimen.  Resumed home dose Paxil 40 mg PO at bedtime.    9/19    vss dc home

## 2021-09-19 NOTE — DISCHARGE NOTE NURSING/CASE MANAGEMENT/SOCIAL WORK - PATIENT PORTAL LINK FT
You can access the FollowMyHealth Patient Portal offered by [facility] by registering at the following website: [email or web address]. By joining [facility]’s FollowMyHealth portal, you will also be able to view your health information using other applications (apps) compatible with our system.

## 2021-09-19 NOTE — PROGRESS NOTE ADULT - SUBJECTIVE AND OBJECTIVE BOX
Patient is a 51y old  Male who presents with a chief complaint of sp  AVR t   ascending aortic graft (19 Sep 2021 06:52)      SUBJECTIVE / OVERNIGHT EVENTS:    Patient seen and examined. no complaints.      Vital Signs Last 24 Hrs  T(C): 36.7 (19 Sep 2021 07:39), Max: 36.9 (18 Sep 2021 22:33)  T(F): 98.1 (19 Sep 2021 07:39), Max: 98.5 (18 Sep 2021 22:33)  HR: 60 (19 Sep 2021 07:39) (58 - 65)  BP: 111/65 (19 Sep 2021 07:39) (96/66 - 132/56)  BP(mean): 87 (19 Sep 2021 04:42) (76 - 100)  RR: 18 (19 Sep 2021 07:39) (18 - 18)  SpO2: 98% (19 Sep 2021 07:39) (96% - 98%)  I&O's Summary    18 Sep 2021 07:01  -  19 Sep 2021 07:00  --------------------------------------------------------  IN: 1160 mL / OUT: 3000 mL / NET: -1840 mL        PE:  GENERAL: NAD, well-developed, comfortable  HEAD:  Atraumatic, Normocephalic  CHEST/LUNG: Clear to auscultation bilaterally; No wheeze, incision dressed  HEART: Regular rate and rhythm; No murmurs, rubs, or gallops  ABDOMEN: Soft, dressed; Bowel sounds present  NEURO: AAOx3, no focal weakness  EXTREMITIES:  2+ Peripheral Pulses      LABS:                        10.1   6.14  )-----------( 193      ( 19 Sep 2021 05:27 )             30.2     09-19    139  |  102  |  18  ----------------------------<  109<H>  3.9   |  24  |  0.88    Ca    9.1      19 Sep 2021 05:27  Phos  3.5     09-19  Mg     2.0     09-19    TPro  6.3  /  Alb  4.2  /  TBili  0.5  /  DBili  x   /  AST  19  /  ALT  23  /  AlkPhos  68  09-19      CAPILLARY BLOOD GLUCOSE                RADIOLOGY & ADDITIONAL TESTS:    Imaging Personally Reviewed:  [x] YES  [ ] NO    Consultant(s) Notes Reviewed:  [x] YES  [ ] NO    MEDICATIONS  (STANDING):  aMIOdarone    Tablet   Oral   aMIOdarone    Tablet 400 milliGRAM(s) Oral every 8 hours  aMIOdarone    Tablet 200 milliGRAM(s) Oral daily  aspirin enteric coated 81 milliGRAM(s) Oral daily  atorvastatin 40 milliGRAM(s) Oral at bedtime  chlorhexidine 2% Cloths 1 Application(s) Topical <User Schedule>  dextrose 50% Injectable 50 milliLiter(s) IV Push every 15 minutes  dextrose 50% Injectable 25 milliLiter(s) IV Push every 15 minutes  enoxaparin Injectable 40 milliGRAM(s) SubCutaneous two times a day  FLUoxetine 40 milliGRAM(s) Oral at bedtime  furosemide    Tablet 20 milliGRAM(s) Oral daily  influenza   Vaccine 0.5 milliLiter(s) IntraMuscular once  metoprolol tartrate 50 milliGRAM(s) Oral every 8 hours  pantoprazole    Tablet 40 milliGRAM(s) Oral before breakfast  polyethylene glycol 3350 17 Gram(s) Oral daily  senna 1 Tablet(s) Oral at bedtime  sodium chloride 0.9%. 1000 milliLiter(s) (10 mL/Hr) IV Continuous <Continuous>  spironolactone 25 milliGRAM(s) Oral daily    MEDICATIONS  (PRN):  oxycodone    5 mG/acetaminophen 325 mG 1 Tablet(s) Oral every 4 hours PRN Mild Pain (1 - 3)  oxycodone    5 mG/acetaminophen 325 mG 2 Tablet(s) Oral every 6 hours PRN Moderate Pain (4 - 6)      Care Discussed with Consultants/Other Providers [x] YES  [ ] NO    HEALTH ISSUES - PROBLEM Dx:  S/P aortic aneurysm repair    S/P AVR (aortic valve replacement)    PAF (paroxysmal atrial fibrillation)    Depression         Patient is a 51y old  Male who presents with a chief complaint of sp  AVR t   ascending aortic graft (19 Sep 2021 06:52)      SUBJECTIVE / OVERNIGHT EVENTS:    Patient seen and examined. no complaints. feels well.      Vital Signs Last 24 Hrs  T(C): 36.7 (19 Sep 2021 07:39), Max: 36.9 (18 Sep 2021 22:33)  T(F): 98.1 (19 Sep 2021 07:39), Max: 98.5 (18 Sep 2021 22:33)  HR: 60 (19 Sep 2021 07:39) (58 - 65)  BP: 111/65 (19 Sep 2021 07:39) (96/66 - 132/56)  BP(mean): 87 (19 Sep 2021 04:42) (76 - 100)  RR: 18 (19 Sep 2021 07:39) (18 - 18)  SpO2: 98% (19 Sep 2021 07:39) (96% - 98%)  I&O's Summary    18 Sep 2021 07:01  -  19 Sep 2021 07:00  --------------------------------------------------------  IN: 1160 mL / OUT: 3000 mL / NET: -1840 mL        PE:  GENERAL: NAD, well-developed, comfortable  HEAD:  Atraumatic, Normocephalic  CHEST/LUNG: Clear to auscultation bilaterally; No wheeze, incisionwell healing abd dressing  HEART: Regular rate and rhythm; No murmurs, rubs, or gallops  ABDOMEN: Soft, dressed; Bowel sounds present  NEURO: AAOx3, no focal weakness  EXTREMITIES:  2+ Peripheral Pulses      LABS:                        10.1   6.14  )-----------( 193      ( 19 Sep 2021 05:27 )             30.2     09-19    139  |  102  |  18  ----------------------------<  109<H>  3.9   |  24  |  0.88    Ca    9.1      19 Sep 2021 05:27  Phos  3.5     09-19  Mg     2.0     09-19    TPro  6.3  /  Alb  4.2  /  TBili  0.5  /  DBili  x   /  AST  19  /  ALT  23  /  AlkPhos  68  09-19      CAPILLARY BLOOD GLUCOSE                RADIOLOGY & ADDITIONAL TESTS:    Imaging Personally Reviewed:  [x] YES  [ ] NO    Consultant(s) Notes Reviewed:  [x] YES  [ ] NO    MEDICATIONS  (STANDING):  aMIOdarone    Tablet   Oral   aMIOdarone    Tablet 400 milliGRAM(s) Oral every 8 hours  aMIOdarone    Tablet 200 milliGRAM(s) Oral daily  aspirin enteric coated 81 milliGRAM(s) Oral daily  atorvastatin 40 milliGRAM(s) Oral at bedtime  chlorhexidine 2% Cloths 1 Application(s) Topical <User Schedule>  dextrose 50% Injectable 50 milliLiter(s) IV Push every 15 minutes  dextrose 50% Injectable 25 milliLiter(s) IV Push every 15 minutes  enoxaparin Injectable 40 milliGRAM(s) SubCutaneous two times a day  FLUoxetine 40 milliGRAM(s) Oral at bedtime  furosemide    Tablet 20 milliGRAM(s) Oral daily  influenza   Vaccine 0.5 milliLiter(s) IntraMuscular once  metoprolol tartrate 50 milliGRAM(s) Oral every 8 hours  pantoprazole    Tablet 40 milliGRAM(s) Oral before breakfast  polyethylene glycol 3350 17 Gram(s) Oral daily  senna 1 Tablet(s) Oral at bedtime  sodium chloride 0.9%. 1000 milliLiter(s) (10 mL/Hr) IV Continuous <Continuous>  spironolactone 25 milliGRAM(s) Oral daily    MEDICATIONS  (PRN):  oxycodone    5 mG/acetaminophen 325 mG 1 Tablet(s) Oral every 4 hours PRN Mild Pain (1 - 3)  oxycodone    5 mG/acetaminophen 325 mG 2 Tablet(s) Oral every 6 hours PRN Moderate Pain (4 - 6)      Care Discussed with Consultants/Other Providers [x] YES  [ ] NO    HEALTH ISSUES - PROBLEM Dx:  S/P aortic aneurysm repair    S/P AVR (aortic valve replacement)    PAF (paroxysmal atrial fibrillation)    Depression

## 2021-09-19 NOTE — PROGRESS NOTE ADULT - ASSESSMENT
51 year old male with significant PMHx of bicuspid aortic valve as a child, HLD, HTN, vestibular schwannoma, acoustic neuroma s/p removal in 2012 with sensorineural hearing loss in the left ear, aortic insufficiency as a child, and known thoracic aortic dilation for 40 years    sp ascending aorta replacement and bio prosthetic aortic valve replacement 9/2     # s/p Aortic valve regurgitation s/p AVR(t), ascending tube graft on 9/13   # HLD/HTN  # pafib  lopressor  lasix aldactone  sp amio load, cont amio  asa  Pain control  Bowel regimen   Incentive Spirometry  Care per CTsx team    # Thrombocytopenia and Acute Blood Loss Anemia  h/h stable    DVT ppx:  Lovenox subq    [facility]  [phone]

## 2021-09-19 NOTE — DISCHARGE NOTE PROVIDER - NSDCMRMEDTOKEN_GEN_ALL_CORE_FT
FLUoxetine 40 mg oral capsule: 1 cap(s) orally once a day  FLUoxetine 40 mg oral capsule: 1 cap(s) orally once a day (at bedtime)  quinapril 20 mg oral tablet: 1 tab(s) orally once a day (at bedtime)  quinapril 20 mg oral tablet: 1 tab(s) orally once a day (at bedtime)  rosuvastatin 10 mg oral tablet: 1 tab(s) orally once a day (at bedtime)  rosuvastatin 10 mg oral tablet: 1 tab(s) orally once a day (at bedtime)  Slow Fe (as elemental iron) 45 mg oral tablet, extended release: 1 tab(s) orally once a day (at bedtime)  Slow Fe (as elemental iron) 45 mg oral tablet, extended release: 1 tab(s) orally once a day (at bedtime)   amiodarone 200 mg oral tablet: 1 tab(s) orally once a day   aspirin 81 mg oral delayed release tablet: 1 tab(s) orally once a day  FLUoxetine 40 mg oral capsule: 1 cap(s) orally once a day (at bedtime)  furosemide 20 mg oral tablet: 1 tab(s) orally once a day  metoprolol tartrate 25 mg oral tablet: 1 tab(s) orally 2 times a day  oxycodone-acetaminophen 5 mg-325 mg oral tablet: 1 tab(s) orally every 4- 6 hrs  hours, As Needed -Mod  Pain (1 - 3) MDD:8   2 tabs for severe pain  pantoprazole 40 mg oral delayed release tablet: 1 tab(s) orally once a day (before a meal)  polyethylene glycol 3350 oral powder for reconstitution: 17 gram(s) orally once a day, As Needed  rosuvastatin 10 mg oral tablet: 1 tab(s) orally once a day (at bedtime)  Slow Fe (as elemental iron) 45 mg oral tablet, extended release: 1 tab(s) orally once a day (at bedtime)  spironolactone 25 mg oral tablet: 1 tab(s) orally once a day

## 2021-09-19 NOTE — PROGRESS NOTE ADULT - PROBLEM SELECTOR PLAN 3
Maintaining NSR   Continue on Amio load 400 mg PO TID for a 5 gram load then decreased to 200 mg PO daily for maintenance dose.  Continue on Lopressor 50 mg PO TID     Continue on Lovenox 40 mg SQ BID
Maintaining NSR   Continue on Amio load 400 mg PO TID for a 5 gram load then decreased to 200 mg PO daily for maintenance dose.  Continue on Lopressor 50 mg PO TID   PW D/C'd   Continue on Lovenox 40 mg SQ BID

## 2021-09-19 NOTE — PROGRESS NOTE ADULT - PROBLEM SELECTOR PROBLEM 1
S/P aortic aneurysm repair

## 2021-09-19 NOTE — PROGRESS NOTE ADULT - PROBLEM SELECTOR PLAN 2
Continue with ASA 81 mg PO Daily.   Continue with Lopressor 50 mg PO TID; Titrate upward as tolerated for a HR goal 70-80.   Continue with Lipitor 40 mg PO HS    Increase activity as tolerated.   PW D/C'd   Encourage Chest PT / Pulmonary toileting and Incentive spirometry every 1 hour x 10 while awake.   Continue with PUD and DVT prophylaxis.   Shower on POD #5.   May transfer to the floor   D/C plan home in AM  Plan of care discussed with attending
Continue with ASA 81 mg PO Daily.   Continue with Lopressor 25 mg PO TID; Titrate upward as tolerated for a HR goal 70-80.   Continue with Lipitor 40 mg PO HS    Increase activity as tolerated.   CXR in AM  Isolated PW   D/C Right IJ   Encourage Chest PT / Pulmonary toileting and Incentive spirometry every 1 hour x 10 while awake.   Continue with PUD and DVT prophylaxis.   Shower on POD #5.   May transfer to the floor   D/C plan home once medically cleared   Plan of care discussed with attending
Continue with ASA 81 mg PO Daily.   Continue with Lopressor 50 mg PO TID; Titrate upward as tolerated for a HR goal 70-80.   Continue with Lipitor 40 mg PO HS      Plan of care discussed with attending

## 2021-09-19 NOTE — PROGRESS NOTE ADULT - SUBJECTIVE AND OBJECTIVE BOX
VITAL SIGNS    Telemetry:      Vital Signs Last 24 Hrs  T(C): 36.8 (21 @ 04:42), Max: 36.9 (21 @ 22:33)  T(F): 98.3 (21 @ 04:42), Max: 98.5 (21 @ 22:33)  HR: 65 (21 @ 04:42) (58 - 65)  BP: 131/69 (21 @ 04:42) (96/66 - 132/56)  RR: 18 (21 @ 04:42) (18 - 18)  SpO2: 98% (21 @ 04:42) (96% - 98%)                   Daily     Daily Weight in k (18 Sep 2021 07:27)      Bilirubin Total, Serum: 0.5 mg/dL ( @ 05:27)    CAPILLARY BLOOD GLUCOSE              Drains:     MS         [  ] Drainage:                 L Pleural  [  ]  Drainage:                R Pleural  [  ]  Drainage:    Pacing Wires        [  ]   Settings:                                  Isolated  [  ]    Coumadin    [ ] YES          [  ]      NO         Reason:                         PHYSICAL EXAM    Neurology: alert and oriented x 3, moves all extremities with no defecits  CV :  RRR  Sternal Wound :  CDI , Stable  Lungs:   CTA B/L  Abdomen: soft, nontender, nondistended, positive bowel sounds, last bowel movement   Extremities:

## 2021-09-19 NOTE — PROGRESS NOTE ADULT - ASSESSMENT
51 year old male with significant PMHx of bicuspid aortic valve as a child, HLD, HTN, vestibular schwannoma, acoustic neuroma s/p removal in 2012 with sensorineural hearing loss in the left ear, aortic insufficiency as a child, and known thoracic aortic dilation for 40 years presents for [facility] pre ascending aorta replacement and bio prosthetic aortic valve replacement planned on 9/2/2021. Patient with c/o fatigue for the last 3-4 months. Recent TTE on 11/2020 EF 60%, severe aortic regurgitation. Patient was referred for evaluation and management of aortic insufficiency and thoracic aortic dilation with Dr. Vogel. Presents to [facility] for cardiac surgery.   On 9/13/21 s/p AVR (T) # 23 (Perimount) / Ascending aorta repair with 34 mm gelweave graft. Intra op received FEIBA / 1 bag Platelet and use of cellsaver and cardiotomy suction  Post op Course:   Extubated POD # 0   Inotropic support required à On Dobutamine gtt weaned off.   Hypertension requiring Cardene gtt à weaned off.   Stress Hyperglycemia requiring insulin gtt à weaned off   9/14 Transferred to SDU overnight; MS Campo à SS   9/15 VVS; MS Campo drain D/C’d this AM.  Started on Lasix 20 mg PO daily and Aldactone 25 mg   PO daily.  Lopressor increased to 25 mg PO TID. Right IJ D/C’d.  Patient may transfer to the floor.     9/16 Recurrent afib this am, cont lopressor/ amio, mag , K. May need to increase lopressor  9/17 Pt nsr/sb overnight. Lovenox at 40 bid for PAF.  Cont amio load.  May transfer to floor.   9/18 VVS; PW D/C'd.  Continue with current medication regimen.  Resumed home dose Paxil 40 mg PO at bedtime.    Disposition: Home PT in AM

## 2021-09-20 ENCOUNTER — NON-APPOINTMENT (OUTPATIENT)
Age: 51
End: 2021-09-20

## 2021-09-20 PROBLEM — H91.90 UNSPECIFIED HEARING LOSS, UNSPECIFIED EAR: Chronic | Status: ACTIVE | Noted: 2021-08-31

## 2021-09-20 PROBLEM — I35.1 NONRHEUMATIC AORTIC (VALVE) INSUFFICIENCY: Chronic | Status: ACTIVE | Noted: 2021-08-31

## 2021-09-20 PROBLEM — Q23.1 CONGENITAL INSUFFICIENCY OF AORTIC VALVE: Chronic | Status: ACTIVE | Noted: 2021-08-31

## 2021-09-20 PROBLEM — E78.5 HYPERLIPIDEMIA, UNSPECIFIED: Chronic | Status: ACTIVE | Noted: 2021-08-31

## 2021-09-20 PROBLEM — I71.2 THORACIC AORTIC ANEURYSM, WITHOUT RUPTURE: Chronic | Status: ACTIVE | Noted: 2021-08-31

## 2021-09-20 PROBLEM — D33.3 BENIGN NEOPLASM OF CRANIAL NERVES: Chronic | Status: ACTIVE | Noted: 2021-08-31

## 2021-09-20 PROBLEM — I51.7 CARDIOMEGALY: Chronic | Status: ACTIVE | Noted: 2021-08-31

## 2021-09-20 RX ORDER — AMIODARONE HYDROCHLORIDE 400 MG/1
1 TABLET ORAL
Qty: 30 | Refills: 0
Start: 2021-09-20 | End: 2021-10-19

## 2021-09-20 RX ORDER — ASPIRIN/CALCIUM CARB/MAGNESIUM 324 MG
1 TABLET ORAL
Qty: 30 | Refills: 0
Start: 2021-09-20 | End: 2021-10-19

## 2021-09-20 RX ORDER — QUINAPRIL HYDROCHLORIDE 20 MG/1
20 TABLET, FILM COATED ORAL DAILY
Qty: 90 | Refills: 3 | Status: DISCONTINUED | COMMUNITY
Start: 2018-11-06 | End: 2021-09-20

## 2021-09-20 RX ORDER — SPIRONOLACTONE 25 MG/1
1 TABLET, FILM COATED ORAL
Qty: 7 | Refills: 0
Start: 2021-09-20 | End: 2021-09-26

## 2021-09-20 RX ORDER — FUROSEMIDE 40 MG
1 TABLET ORAL
Qty: 7 | Refills: 0
Start: 2021-09-20 | End: 2021-09-26

## 2021-09-21 ENCOUNTER — APPOINTMENT (OUTPATIENT)
Dept: CARE COORDINATION | Facility: HOME HEALTH | Age: 51
End: 2021-09-21
Payer: COMMERCIAL

## 2021-09-21 VITALS
HEART RATE: 72 BPM | OXYGEN SATURATION: 98 % | SYSTOLIC BLOOD PRESSURE: 128 MMHG | RESPIRATION RATE: 16 BRPM | BODY MASS INDEX: 28.67 KG/M2 | DIASTOLIC BLOOD PRESSURE: 84 MMHG | WEIGHT: 167 LBS

## 2021-09-21 DIAGNOSIS — Z09 ENCOUNTER FOR FOLLOW-UP EXAMINATION AFTER COMPLETED TREATMENT FOR CONDITIONS OTHER THAN MALIGNANT NEOPLASM: ICD-10-CM

## 2021-09-21 PROCEDURE — 99024 POSTOP FOLLOW-UP VISIT: CPT

## 2021-09-21 NOTE — HISTORY OF PRESENT ILLNESS
[FreeTextEntry1] : 51M s/p Roland Vogel \par lives with wife, good support\par  all questions answered\par taking naps during the day, having difficulty sleeping at night\par taking 2 percocet every 6 hours\par

## 2021-09-21 NOTE — PHYSICAL EXAM
[] : no respiratory distress [Respiration, Rhythm And Depth] : normal respiratory rhythm and effort [Auscultation Breath Sounds / Voice Sounds] : lungs were clear to auscultation bilaterally [Heart Rate And Rhythm] : heart rate was normal and rhythm regular [Heart Sounds] : normal S1 and S2 [FreeTextEntry1] : MSI, CT sites and SVG sites without erythema, drainage or warmth, with edges well approximated.  Sternum stable. BLE minimal edema

## 2021-09-30 PROBLEM — Z09 POSTOPERATIVE FOLLOW-UP: Status: ACTIVE | Noted: 2021-09-30

## 2021-10-04 ENCOUNTER — NON-APPOINTMENT (OUTPATIENT)
Age: 51
End: 2021-10-04

## 2021-10-04 ENCOUNTER — APPOINTMENT (OUTPATIENT)
Dept: CARDIOTHORACIC SURGERY | Facility: CLINIC | Age: 51
End: 2021-10-04
Payer: COMMERCIAL

## 2021-10-04 VITALS
RESPIRATION RATE: 16 BRPM | SYSTOLIC BLOOD PRESSURE: 109 MMHG | WEIGHT: 162 LBS | TEMPERATURE: 97.8 F | HEART RATE: 59 BPM | OXYGEN SATURATION: 100 % | HEIGHT: 64 IN | DIASTOLIC BLOOD PRESSURE: 71 MMHG | BODY MASS INDEX: 27.66 KG/M2

## 2021-10-04 DIAGNOSIS — Z09 ENCOUNTER FOR FOLLOW-UP EXAMINATION AFTER COMPLETED TREATMENT FOR CONDITIONS OTHER THAN MALIGNANT NEOPLASM: ICD-10-CM

## 2021-10-04 PROCEDURE — 99024 POSTOP FOLLOW-UP VISIT: CPT

## 2021-10-04 PROCEDURE — 93000 ELECTROCARDIOGRAM COMPLETE: CPT

## 2021-10-04 RX ORDER — SPIRONOLACTONE 25 MG/1
25 TABLET ORAL DAILY
Refills: 0 | Status: COMPLETED | COMMUNITY
Start: 2021-09-20 | End: 2021-10-04

## 2021-10-04 RX ORDER — POLYETHYLENE GLYCOL 3350 17 G/17G
17 POWDER, FOR SOLUTION ORAL
Refills: 0 | Status: COMPLETED | COMMUNITY
Start: 2021-09-20 | End: 2021-10-04

## 2021-10-04 RX ORDER — OXYCODONE 5 MG/1
5 TABLET ORAL EVERY 6 HOURS
Qty: 20 | Refills: 0 | Status: COMPLETED | COMMUNITY
Start: 2021-09-21 | End: 2021-10-04

## 2021-10-04 RX ORDER — METOPROLOL TARTRATE 25 MG/1
25 TABLET, FILM COATED ORAL TWICE DAILY
Refills: 0 | Status: COMPLETED | COMMUNITY
Start: 2021-09-20 | End: 2021-10-04

## 2021-10-04 RX ORDER — FUROSEMIDE 20 MG/1
20 TABLET ORAL DAILY
Refills: 0 | Status: COMPLETED | COMMUNITY
Start: 2021-09-20 | End: 2021-10-04

## 2021-10-08 ENCOUNTER — APPOINTMENT (OUTPATIENT)
Dept: CARDIOLOGY | Facility: CLINIC | Age: 51
End: 2021-10-08
Payer: COMMERCIAL

## 2021-10-08 VITALS
BODY MASS INDEX: 28.34 KG/M2 | HEART RATE: 55 BPM | SYSTOLIC BLOOD PRESSURE: 100 MMHG | DIASTOLIC BLOOD PRESSURE: 60 MMHG | OXYGEN SATURATION: 99 % | WEIGHT: 166 LBS | HEIGHT: 64 IN | RESPIRATION RATE: 16 BRPM

## 2021-10-08 DIAGNOSIS — H90.5 UNSPECIFIED SENSORINEURAL HEARING LOSS: ICD-10-CM

## 2021-10-08 PROCEDURE — 99214 OFFICE O/P EST MOD 30 MIN: CPT

## 2021-10-08 PROCEDURE — 93000 ELECTROCARDIOGRAM COMPLETE: CPT

## 2021-10-08 NOTE — REASON FOR VISIT
[FreeTextEntry1] :  51 year old male  patient presents here for cardiac reevaluation s/p Aortic valve replacement using 23 bovine pericardial valve, 2700 TFX , ascending aortic replacement from sinotubular junction to several millimeters from clamp utilizing 34 Hemashield graft on 9/13/21\par \par Hx includes"\par 1.	A bicuspid aortic valve.\par 2.	Severe aortic insufficiency.\par 3.	A thoracic aortic aneurysm.\par 4.	Left ventricular dilatation.

## 2021-10-08 NOTE — HISTORY OF PRESENT ILLNESS
[FreeTextEntry1] : Post op course remarkable for paroxysmal atrial fibrillation.  \par He is not on anticoagulation therapy.  \par Incisional discomfort has largely dissipated \par Edema has largely resolved.\par There is no significant shortness of breath palpitations.  \par Has been walking nearly daily with improving functional capacity.\par \par .There has been an increase in his personal stressors due to ill parents ( Mom - passed; Dad - will Lymphoma)   and his business.  Nonetheless, denies any shortness of breath, chest pain, or palpitations.\par \par Other medical issues are that of resected  acoustic neuroma with sensorineural hearing loss, left sided.\par \par 8/30/19 Cardiac MRI \par Moderate aortic insufficieny\par Mild enlargement of the aortic root measuring 4.2 cm\par Mod. enlargement of the ascending aorta measuring 4.8 cm\par Sev. enlargement of the left ventricle   EF = 50%\par RV EF 51%\par \par \par

## 2021-10-08 NOTE — PHYSICAL EXAM
[FreeTextEntry1] :                    Well appearing and nourished with no obvious deformities or distress.\par \par Eyes: \par No conjunctival injection and no xanthelasmas.\par HEENT: \par Normocephalic.Normal oral mucosa. No pallor or cyanosis\par Neck: \par No jugular venous distension. with normal A and V wave forms. No palpable adenopathy.\par Cardiovascular: Sternal incision healing well with no erythema or drainage.\par Normal rate and rhythm with normal S1, S2 and a grade 2/6 systolic murmur, 2/6 diastolic blow. Distal arterial pulses are normal. No significant peripheral edema.\par Pulmonary: \par Lungs are clear to auscultation and percussion. Normal respiratory pattern without any accessory muscle use\par Abdomen: \par Soft, non-tender ; no palpable organomegaly or masses.\par Extremities:\par No digital clubbing, cyanosis or ischemic changes.\par Skin: \par No skin lesions, rashes, ulcers or xanthomas.\par Psychiatric: \par Alert and oriented to person, place and time. Left ear sensorineural hearing loss. Appropriate mood and affect.

## 2021-10-19 ENCOUNTER — TRANSCRIPTION ENCOUNTER (OUTPATIENT)
Age: 51
End: 2021-10-19

## 2021-11-03 ENCOUNTER — NON-APPOINTMENT (OUTPATIENT)
Age: 51
End: 2021-11-03

## 2021-11-16 ENCOUNTER — APPOINTMENT (OUTPATIENT)
Dept: CARDIOLOGY | Facility: CLINIC | Age: 51
End: 2021-11-16
Payer: COMMERCIAL

## 2021-11-16 PROCEDURE — ZZZZZ: CPT

## 2021-11-16 PROCEDURE — 93306 TTE W/DOPPLER COMPLETE: CPT

## 2021-11-19 ENCOUNTER — OUTPATIENT (OUTPATIENT)
Dept: OUTPATIENT SERVICES | Facility: HOSPITAL | Age: 51
LOS: 1 days | End: 2021-11-19
Payer: COMMERCIAL

## 2021-11-19 ENCOUNTER — APPOINTMENT (OUTPATIENT)
Dept: CT IMAGING | Facility: CLINIC | Age: 51
End: 2021-11-19
Payer: COMMERCIAL

## 2021-11-19 DIAGNOSIS — Z98.890 OTHER SPECIFIED POSTPROCEDURAL STATES: Chronic | ICD-10-CM

## 2021-11-19 DIAGNOSIS — Z95.2 PRESENCE OF PROSTHETIC HEART VALVE: ICD-10-CM

## 2021-11-19 PROCEDURE — 71275 CT ANGIOGRAPHY CHEST: CPT | Mod: 26

## 2021-11-19 PROCEDURE — 71275 CT ANGIOGRAPHY CHEST: CPT

## 2021-11-29 ENCOUNTER — NON-APPOINTMENT (OUTPATIENT)
Age: 51
End: 2021-11-29

## 2021-12-13 ENCOUNTER — APPOINTMENT (OUTPATIENT)
Dept: CARDIOLOGY | Facility: CLINIC | Age: 51
End: 2021-12-13

## 2022-01-14 ENCOUNTER — APPOINTMENT (OUTPATIENT)
Dept: CARDIOLOGY | Facility: CLINIC | Age: 52
End: 2022-01-14
Payer: COMMERCIAL

## 2022-01-14 VITALS
DIASTOLIC BLOOD PRESSURE: 68 MMHG | SYSTOLIC BLOOD PRESSURE: 92 MMHG | RESPIRATION RATE: 16 BRPM | OXYGEN SATURATION: 99 % | HEART RATE: 68 BPM | HEIGHT: 64 IN

## 2022-01-14 DIAGNOSIS — G89.18 OTHER ACUTE POSTPROCEDURAL PAIN: ICD-10-CM

## 2022-01-14 PROCEDURE — 99214 OFFICE O/P EST MOD 30 MIN: CPT

## 2022-01-14 PROCEDURE — 93000 ELECTROCARDIOGRAM COMPLETE: CPT

## 2022-01-14 RX ORDER — AMIODARONE HYDROCHLORIDE 200 MG/1
200 TABLET ORAL DAILY
Refills: 0 | Status: DISCONTINUED | COMMUNITY
Start: 2021-09-20 | End: 2022-01-14

## 2022-01-14 NOTE — ASSESSMENT
[FreeTextEntry1] : ECG: Normal sinus rhythm at 55 bpm.  Minor nonspecific ST-T wave changes, improved from prior.\par \par \par Lab Data \par 5/16/19            3/24/2021\par Chol. 254              165\par HDL 59                   61\par                  90\par Tri. 121                  72\par LFTs WNL\par HGB 14.9\par Creat. 0.88\par \par \par CT angio chest 11/19/2021:\par Sinus of Valsalva 3.1,x 3.1 x 3.0\par Ascending aorta graft dimensions 3.7 x 3.2 cm\par Aortic arch 2.6 x 2.7 cm\par Descending aorta 2.4 x 2.4 cm.\par \par Echocardiogram 11/16/2021:\par Normal LV size and function ejection fraction 60%\par Well-seated aortic valve bioprosthesis\par Ascending aortic graft looks good maximum diameter 3.7.\par Compared to the prior echocardiogram LV size has decreased, systolic function has improved, the bioprosthesis is new, the presence of aortic graft is seen\par \par Echo 11/17/2020\par EF 60%\par Mod- Sev. left ventricle size.\par Trace Tricuspid regurgitation \par Aortic valve is bicuspid\par Mild. aortic stenosis\par Severe aortic regurgitation Mild tricuspid . \par Trace pulmonic  regurgitation \par Moderate dilation of the ascending aorta 4.7 cm  ( 4.40 in 2/2020)\par \par Echo 2/27/20\par EF 55-60%\par Dilated cardiomyopathy 6.5 cm\par Aortic valve is bicuspid\par Severe AI\par Mod. dilation of the ascending aorta measuring 4.40 cm\par \par \par 8/30/19 Cardiac MRI \par Moderate aortic insufficieny\par Mild enlargement of the aortic root measuring 4.2 cm\par Mod. enlargement of the ascending aorta measuring 4.8 cm\par Sev. enlargement of the left ventricle \par LV EF 50%\par \par A cardiac MRI performed 6/12/18 revealed the following:\par A dilated left ventricle with an ejection fraction of 52%.\par Mildly dilated right ventricle with normal function\par Left atrial dilatation\par A bicuspid aortic valve with severe regurgitation.\par A dilated ascending aorta maximal diameter 4.3 cm.\par \par \par Echocardiogram performed today: 11/12/18:\par Moderately dilated left ventricle with a left ventricular ejection fraction of 55-60%.\par A bicuspid aortic valve with severe insufficiency\par Moderately dilated ascending aorta unchanged from prior.\par Compared with the prior echocardiogram 5/31/18, there seems to be a decrease in left ventricular end-diastolic dimension and an improvement in the left ventricular ejection fraction.\par Echocardiogram 5/31/18\par Moderately dilated left ventricle with low normal ventricular systolic function. LVEF approximately 50-55%.\par Bicuspid aortic valve with severe  insufficiency\par Moderately severe dilatation of the ascending aorta measuring maximally 4.6 cm.\par \par Impression\par - Postop pericardial aortic valve replacement for severe bicuspid valve aortic insufficiency and replacement of his ascending thoracic aorta due to dilatation.\par Postop echocardiogram shows that the LV dimensions are now normal and systolic function entirely preserved.\par \par  -Has diuresed well and is mobilizing without difficulty.\par \par -Incisions all appear to be well-healed.\par \par - The T wave inversions anteriorly on his ECG.  Seem to be normalizing\par \par - Low blood pressure may be limiting medical therapy at this point\par \par - History of hyperlipidemia being treated.  No current labs available\par \par -Ascending aortic graft and AVR all seem to be intact\par \par Plan\par \par 1.  Patient will continue to ambulate and mobilize and a progressive symptom limited manner.\par \par 2.  Continue quinapril at 5 mg a day.\par      Because of low blood pressure will cut t Toprol-XL to half tablet 12.5 mg a day and stagger the 2 meds\par \par 3.  Laboratory data especially lipids\par \par \par \par 5.  An echocardiogram will be obtained in about 6 weeks\par \par 6.  A postoperative CTA of the chest is planned at about 3 months postop.\par \par \par \par \par

## 2022-01-14 NOTE — HISTORY OF PRESENT ILLNESS
[FreeTextEntry1] : Post op course remarkable for paroxysmal atrial fibrillation.  \par He is not on anticoagulation therapy.  \par Incisional discomfort has largely dissipated \par Edema has largely resolved.\par There is no significant shortness of breath palpitations.  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  Nonetheless, denies any shortness of breath, chest pain, or palpitations.\par \par Other medical issues are that of resected  acoustic neuroma with sensorineural hearing loss, left sided.\par \par 8/30/19 Cardiac MRI \par Moderate aortic insufficieny\par Mild enlargement of the aortic root measuring 4.2 cm\par Mod. enlargement of the ascending aorta measuring 4.8 cm\par Sev. enlargement of the left ventricle   EF = 50%\par RV EF 51%\par \par \par Patient appears a bit tired today.  States that he has been quartering his Accupril 20 mg tablets and may be took a larger piece than normal.  Blood pressure is in the 90s.\par

## 2022-03-11 LAB
ALBUMIN SERPL ELPH-MCNC: 4.5 G/DL
ALP BLD-CCNC: 81 U/L
ALT SERPL-CCNC: 25 U/L
ANION GAP SERPL CALC-SCNC: 12 MMOL/L
AST SERPL-CCNC: 22 U/L
BILIRUB SERPL-MCNC: 0.7 MG/DL
BUN SERPL-MCNC: 17 MG/DL
CALCIUM SERPL-MCNC: 9.1 MG/DL
CHLORIDE SERPL-SCNC: 101 MMOL/L
CHOLEST SERPL-MCNC: 144 MG/DL
CO2 SERPL-SCNC: 26 MMOL/L
CREAT SERPL-MCNC: 0.89 MG/DL
EGFR: 102 ML/MIN/1.73M2
GLUCOSE SERPL-MCNC: 98 MG/DL
HDLC SERPL-MCNC: 47 MG/DL
LDLC SERPL CALC-MCNC: 73 MG/DL
NONHDLC SERPL-MCNC: 97 MG/DL
POTASSIUM SERPL-SCNC: 4.7 MMOL/L
PROT SERPL-MCNC: 6.3 G/DL
SODIUM SERPL-SCNC: 140 MMOL/L
TRIGL SERPL-MCNC: 124 MG/DL

## 2022-03-14 LAB
ESTIMATED AVERAGE GLUCOSE: 105 MG/DL
HBA1C MFR BLD HPLC: 5.3 %

## 2022-03-28 ENCOUNTER — APPOINTMENT (OUTPATIENT)
Dept: CARDIOLOGY | Facility: CLINIC | Age: 52
End: 2022-03-28
Payer: COMMERCIAL

## 2022-03-28 VITALS
HEIGHT: 64 IN | SYSTOLIC BLOOD PRESSURE: 95 MMHG | HEART RATE: 78 BPM | BODY MASS INDEX: 29.19 KG/M2 | DIASTOLIC BLOOD PRESSURE: 60 MMHG | RESPIRATION RATE: 16 BRPM | WEIGHT: 171 LBS | OXYGEN SATURATION: 98 %

## 2022-03-28 DIAGNOSIS — Z00.00 ENCOUNTER FOR GENERAL ADULT MEDICAL EXAMINATION W/OUT ABNORMAL FINDINGS: ICD-10-CM

## 2022-03-28 PROCEDURE — 93306 TTE W/DOPPLER COMPLETE: CPT

## 2022-03-28 PROCEDURE — 99215 OFFICE O/P EST HI 40 MIN: CPT

## 2022-03-28 PROCEDURE — 93000 ELECTROCARDIOGRAM COMPLETE: CPT

## 2022-03-28 RX ORDER — PERFLUTREN 6.52 MG/ML
6.52 INJECTION, SUSPENSION INTRAVENOUS
Qty: 2 | Refills: 0 | Status: COMPLETED | OUTPATIENT
Start: 2022-03-28

## 2022-03-28 RX ADMIN — PERFLUTREN MG/ML: 6.52 INJECTION, SUSPENSION INTRAVENOUS at 00:00

## 2022-03-28 NOTE — HISTORY OF PRESENT ILLNESS
[FreeTextEntry1] : By 2 months postop, patient was feeling relatively well with increasing functional capacity and had begun exercising a bit more regularly.\par States that he remained feeling well until late February.  At that point, 5 days into a overseas flight to [place], he began feeling exertional fatigue and shortness of breath.  The symptoms have continued and perhaps even progressed over the course of the ensuing weeks.  Walking and stairs in particular are difficult and riding his bicycle is better tolerated.\par \par There is no chest pain, cough, wheeze, edema.\par Blood pressure has been well within normal limits.\par Weight has remained a bit elevated.\par Exercise has been a bit less frequent.\par \par Immediate post op course September 2021 remarkable for paroxysmal atrial fibrillation.  \par \par .There has been an increase in his personal stressors due to ill parents ( Mom - passed; Dad -has lymphoma)   and he has had business problems.  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 enlargement of the ascending aorta measuring 4.8 cm\par Sev. enlargement of the left ventricle   EF = 50%\par RV EF 51%\par \par

## 2022-03-28 NOTE — ASSESSMENT
[FreeTextEntry1] : Lab Data \par ------5/16/19-----3/24/21----3/11/22\par Chol--254----------165-------144\par HDL---59------------61--------47\par LDL--171------------90--------73\par Tri---121------------72--------124\par LFTs--WNL\par HGB 14.9\par Creat. 0.88\par \par \par CT angio chest 11/19/2021:\par Sinus of Valsalva 3.1,x 3.1 x 3.0\par Ascending aorta graft dimensions 3.7 x 3.2 cm\par Aortic arch 2.6 x 2.7 cm\par Descending aorta 2.4 x 2.4 cm.\par \par Echocardiogram 3/28/2022:\par Septum asynchronous.  Low normal global left ventricular systolic function\par Left ventricular ejection fraction 50 to 55%\par Bioprosthetic aortic valve well-seated without any insufficiency\par Ascending aorta and root appear to be within normal limits.\par No evidence of bone hypertension.\par Right ventricle borderline enlarged with low normal function.\par \par Echocardiogram 11/16/2021:\par Normal LV size and function ejection fraction 60%\par Well-seated aortic valve bioprosthesis\par Ascending aortic graft looks good maximum diameter 3.7.\par Compared to the prior echocardiogram LV size has decreased, systolic function has improved, the bioprosthesis is new, the presence of aortic graft is seen\par \par Echo 11/17/2020\par EF 60%\par Mod- Sev. left ventricle size.\par Trace Tricuspid regurgitation \par Aortic valve is bicuspid\par Mild. aortic stenosis\par Severe aortic regurgitation Mild tricuspid . \par Trace pulmonic  regurgitation \par Moderate dilation of the ascending aorta 4.7 cm  ( 4.40 in 2/2020)\par \par Echo 2/27/20\par EF 55-60%\par Dilated cardiomyopathy 6.5 cm\par Aortic valve is bicuspid\par Severe AI\par Mod. dilation of the ascending aorta measuring 4.40 cm\par \par \par 8/30/19 Cardiac MRI \par Moderate aortic insufficieny\par Mild enlargement of the aortic root measuring 4.2 cm\par Mod. enlargement of the ascending aorta measuring 4.8 cm\par Sev. enlargement of the left ventricle \par LV EF 50%\par \par A cardiac MRI performed 6/12/18 revealed the following:\par A dilated left ventricle with an ejection fraction of 52%.\par Mildly dilated right ventricle with normal function\par Left atrial dilatation\par A bicuspid aortic valve with severe regurgitation.\par A dilated ascending aorta maximal diameter 4.3 cm.\par \par \par Echocardiogram performed today: 11/12/18:\par Moderately dilated left ventricle with a left ventricular ejection fraction of 55-60%.\par A bicuspid aortic valve with severe insufficiency\par Moderately dilated ascending aorta unchanged from prior.\par Compared with the prior echocardiogram 5/31/18, there seems to be a decrease in left ventricular end-diastolic dimension and an improvement in the left ventricular ejection fraction.\par Echocardiogram 5/31/18\par Moderately dilated left ventricle with low normal ventricular systolic function. LVEF approximately 50-55%.\par Bicuspid aortic valve with severe  insufficiency\par Moderately severe dilatation of the ascending aorta measuring maximally 4.6 cm.\par \par Impression\par -Shortness of breath and fatigability developed about 5 months postoperatively and has continued for the last 1 month.\par -No appreciable change in left ventricular systolic function which remains low normal\par -Aortic valve bioprosthesis appears to be functional intact as it the aortic root\par - Postop pericardial aortic valve replacement for severe bicuspid valve aortic insufficiency and replacement of his ascending thoracic aorta due to dilatation.\par -History of occult blood loss on chronic iron replacement therapy\par -History of a long plane flight before these symptoms began..\par -Borderline low blood pressure seemingly tolerated though weakness possibly related.\par \par - Low blood pressure may be limiting medical therapy at this point\par \par - History of hyperlipidemia improved on most recent lab work.\par \par -Ascending aortic graft and AVR all seem to be intact\par \par Plan\par \par 1.  CT with contrast of the chest to rule out pulmonary embolism.\par \par 2.  Continue quinapril at 5 mg a day.\par      Because of low blood pressure will cut t Toprol-XL to half tablet 12.5 mg a day and stagger the 2 meds\par \par 3.  CBC to rule out anemia and occult blood loss\par \par 4.  A postoperative CTA of the chest is planned at about 3 months postop.\par \par \par \par \par

## 2022-03-28 NOTE — REVIEW OF SYSTEMS
[Weight Gain (___ Lbs)] : [unfilled] ~Ulb weight gain [Weight Loss (___ Lbs)] : no recent weight loss [Dyspnea on exertion] : dyspnea during exertion [SOB] : no shortness of breath [Chest Discomfort] : no chest discomfort [Lower Ext Edema] : no extremity edema [Orthopnea] : no orthopnea [FreeTextEntry2] : Other than as documented here and in the HPI, the thirteen point ROS is negative

## 2022-03-28 NOTE — PHYSICAL EXAM
[FreeTextEntry1] :                    Well appearing and nourished with no obvious deformities or distress.\par \par Eyes: \par No conjunctival injection and no xanthelasmas.\par HEENT: \par Normocephalic.Normal oral mucosa. No pallor or cyanosis\par Neck: \par No jugular venous distension. with normal A and V wave forms. No palpable adenopathy.\par Cardiovascular: Sternal incision healing well with no erythema or drainage.\par Normal rate and rhythm with normal S1, S2 and a grade 2/6 systolic murmur,  Distal arterial pulses are normal. No significant peripheral edema.\par Pulmonary: \par Lungs are clear to auscultation and percussion. Normal respiratory pattern without any accessory muscle use\par Abdomen: \par Soft, non-tender ; no palpable organomegaly or masses.\par Extremities:\par No digital clubbing, cyanosis or ischemic changes.\par Skin: \par No skin lesions, rashes, ulcers or xanthomas.\par Psychiatric: \par Alert and oriented to person, place and time. Left ear sensorineural hearing loss. Appropriate mood and affect.

## 2022-03-28 NOTE — DISCUSSION/SUMMARY
TRANSFER - OUT REPORT:    Verbal report given to tej aguilar(name) on Shani Rodney  being transferred to [facility](unit) for routine post - op       Report consisted of patients Situation, Background, Assessment and   Recommendations(SBAR). Information from the following report(s) SBAR, OR Summary, Intake/Output and MAR was reviewed with the receiving nurse. Lines:   Peripheral IV 07/03/17 Right Hand (Active)   Site Assessment Clean, dry, & intact 7/3/2017 11:24 AM   Phlebitis Assessment 0 7/3/2017 11:24 AM   Infiltration Assessment 0 7/3/2017 11:24 AM   Dressing Status Clean, dry, & intact 7/3/2017 11:24 AM   Dressing Type Transparent;Tape 7/3/2017 11:24 AM   Hub Color/Line Status Infusing;Pink 7/3/2017 11:24 AM   Action Taken Open ports on tubing capped 7/3/2017  7:03 AM   Alcohol Cap Used Yes 7/3/2017  7:03 AM       Peripheral IV 07/03/17 Left Arm (Active)   Site Assessment Clean, dry, & intact 7/3/2017 11:24 AM   Phlebitis Assessment 0 7/3/2017 11:24 AM   Infiltration Assessment 0 7/3/2017 11:24 AM   Dressing Status Clean, dry, & intact 7/3/2017 11:24 AM   Dressing Type Transparent;Tape 7/3/2017 11:24 AM   Hub Color/Line Status Infusing 7/3/2017 11:24 AM       Arterial Line 07/03/17 Right Radial artery (Active)        Opportunity for questions and clarification was provided.       Patient transported with:   Registered Nurse  Tech [FreeTextEntry1] : Due to elevated lipid panel collected on 12/23/20 , total cholesterol 258. Mr. Tolentino will be rx'd Rosuvastatin 10 mg per Dr. Robbins. \par \par Repeat BW to be done in 3 months and mailed to his residence. \par Mr. Tolentino was updated by Dr. Robbins via phone conversation.

## 2022-03-28 NOTE — REASON FOR VISIT
[FreeTextEntry1] :  51 year old male  patient presents here for urgent evaluation with complaints of shortness of breath and fatigability.  \par Importantly, patient is s/p Aortic valve replacement using 23 bovine pericardial valve, 2700 TFX , ascending aortic replacement from sinotubular junction to several millimeters from clamp utilizing 34 Hemashield graft on 9/13/21\par \par Hx includes"\par 1.	A bicuspid aortic valve.\par 2.	Severe aortic insufficiency.\par 3.	A thoracic aortic aneurysm.\par 4.	Left ventricular dilatation.

## 2022-03-31 LAB
BASOPHILS # BLD AUTO: 0.03 K/UL
BASOPHILS NFR BLD AUTO: 0.5 %
EOSINOPHIL # BLD AUTO: 0.05 K/UL
EOSINOPHIL NFR BLD AUTO: 0.9 %
HCT VFR BLD CALC: 47.8 %
HGB BLD-MCNC: 15.9 G/DL
IMM GRANULOCYTES NFR BLD AUTO: 0 %
LYMPHOCYTES # BLD AUTO: 2.51 K/UL
LYMPHOCYTES NFR BLD AUTO: 43.8 %
MAN DIFF?: NORMAL
MCHC RBC-ENTMCNC: 31.1 PG
MCHC RBC-ENTMCNC: 33.3 GM/DL
MCV RBC AUTO: 93.4 FL
MONOCYTES # BLD AUTO: 0.58 K/UL
MONOCYTES NFR BLD AUTO: 10.1 %
NEUTROPHILS # BLD AUTO: 2.56 K/UL
NEUTROPHILS NFR BLD AUTO: 44.7 %
PLATELET # BLD AUTO: 202 K/UL
PSA FREE FLD-MCNC: 19 %
PSA FREE SERPL-MCNC: 0.16 NG/ML
PSA SERPL-MCNC: 0.85 NG/ML
RBC # BLD: 5.12 M/UL
RBC # FLD: 13 %
WBC # FLD AUTO: 5.73 K/UL

## 2022-06-17 ENCOUNTER — APPOINTMENT (OUTPATIENT)
Dept: CARDIOLOGY | Facility: CLINIC | Age: 52
End: 2022-06-17

## 2022-07-07 ENCOUNTER — APPOINTMENT (OUTPATIENT)
Dept: CARDIOLOGY | Facility: CLINIC | Age: 52
End: 2022-07-07

## 2022-08-11 ENCOUNTER — APPOINTMENT (OUTPATIENT)
Dept: CARDIOLOGY | Facility: CLINIC | Age: 52
End: 2022-08-11

## 2022-08-11 VITALS
BODY MASS INDEX: 30.05 KG/M2 | DIASTOLIC BLOOD PRESSURE: 80 MMHG | SYSTOLIC BLOOD PRESSURE: 120 MMHG | WEIGHT: 176 LBS | HEART RATE: 59 BPM | HEIGHT: 64 IN | OXYGEN SATURATION: 98 % | RESPIRATION RATE: 16 BRPM

## 2022-08-11 PROCEDURE — 99214 OFFICE O/P EST MOD 30 MIN: CPT | Mod: 25

## 2022-08-11 PROCEDURE — 93000 ELECTROCARDIOGRAM COMPLETE: CPT

## 2022-08-11 RX ORDER — FLUOXETINE HYDROCHLORIDE 20 MG/1
20 CAPSULE ORAL
Refills: 0 | Status: ACTIVE | COMMUNITY
Start: 2020-12-23

## 2022-08-21 NOTE — ASSESSMENT
[FreeTextEntry1] : ECG: Normal sinus rhythm at 60.  Nonspecific T wave abnormalities.\par \par Lab Data \par ------5/16/19-----3/24/21----3/11/22\par Chol--254----------165-------144\par HDL---59------------61--------47\par LDL--171------------90--------73\par Tri---121------------72--------124\par LFTs--WNL\par HGB 14.9\par Creat. 0.88\par \par \par Postop CT angio chest 11/19/2021:\par Sinus of Valsalva 3.1,x 3.1 x 3.0\par Ascending aorta graft dimensions 3.7 x 3.2 cm\par Aortic arch 2.6 x 2.7 cm\par Descending aorta 2.4 x 2.4 cm.\par \par Echocardiogram 3/28/2022:\par Septum asynchronous.  Low normal global left ventricular systolic function\par Left ventricular ejection fraction 50 to 55%\par Bioprosthetic aortic valve well-seated without any insufficiency\par Ascending aorta and root appear to be within normal limits.\par No evidence of bone hypertension.\par Right ventricle borderline enlarged with low normal function.\par \par Echocardiogram 11/16/2021:\par Normal LV size and function ejection fraction 60%\par Well-seated aortic valve bioprosthesis\par Ascending aortic graft looks good maximum diameter 3.7.\par Compared to the prior echocardiogram LV size has decreased, systolic function has improved, the bioprosthesis is new, the presence of aortic graft is seen\par \par Echo 11/17/2020\par EF 60%\par Mod- Sev. left ventricle size.\par Trace Tricuspid regurgitation \par Aortic valve is bicuspid\par Mild. aortic stenosis\par Severe aortic regurgitation Mild tricuspid . \par Trace pulmonic  regurgitation \par Moderate dilation of the ascending aorta 4.7 cm  ( 4.40 in 2/2020)\par \par Echo 2/27/20\par EF 55-60%\par Dilated cardiomyopathy 6.5 cm\par Aortic valve is bicuspid\par Severe AI\par Mod. dilation of the ascending aorta measuring 4.40 cm\par \par \par 8/30/19 Cardiac MRI \par Moderate aortic insufficieny\par Mild enlargement of the aortic root measuring 4.2 cm\par Mod. enlargement of the ascending aorta measuring 4.8 cm\par Sev. enlargement of the left ventricle \par LV EF 50%\par \par A cardiac MRI performed 6/12/18 revealed the following:\par A dilated left ventricle with an ejection fraction of 52%.\par Mildly dilated right ventricle with normal function\par Left atrial dilatation\par A bicuspid aortic valve with severe regurgitation.\par A dilated ascending aorta maximal diameter 4.3 cm.\par \par \par Echocardiogram performed today: 11/12/18:\par Moderately dilated left ventricle with a left ventricular ejection fraction of 55-60%.\par A bicuspid aortic valve with severe insufficiency\par Moderately dilated ascending aorta unchanged from prior.\par Compared with the prior echocardiogram 5/31/18, there seems to be a decrease in left ventricular end-diastolic dimension and an improvement in the left ventricular ejection fraction.\par Echocardiogram 5/31/18\par Moderately dilated left ventricle with low normal ventricular systolic function. LVEF approximately 50-55%.\par Bicuspid aortic valve with severe  insufficiency\par Moderately severe dilatation of the ascending aorta measuring maximally 4.6 cm.\par \par Impression\par -All previously reported cardiac symptoms of shortness of breath and fatigability have resolved.\par -No appreciable change in left ventricular systolic function which remains low normal\par -Aortic valve bioprosthesis appears to be functional intact as it the aortic root\par - Postop pericardial aortic valve replacement for severe bicuspid valve aortic insufficiency and replacement of his ascending thoracic aorta due to dilatation.\par -History of occult blood loss on chronic iron replacement therapy\par -Hyperlipidemia last assessed March 2022\par -Ascending aortic graft and AVR all seem to be intact\par \par Plan\par \par 1.  Instructed the patient about the benefits of a diet that restricts portion sizes, increases frequency of meals and consists of  vegetables, (more green and leafy),fruit and nuts, whole grains, lean proteins and limits carbohydrates and meat and dairy fats\par \par 2.  Continue quinapril at 5 mg a day.\par     Continue Toprol-XL  half tablet 12.5 mg a day and stagger the 2 meds\par \par 3.  Lipid and metabolic panel in the near future\par \par 4.  patient was instructed to follow a symptom limited regimen of moderate aerobic exercise for 30 minutes 3 to 4 days a week. A warm up and cool down period are to be added to the regimen and small incremental changes can be made every few weeks. Any new symptoms of exercise related chest pain or dyspnea should be reported.\par \par \par \par \par

## 2022-08-21 NOTE — HISTORY OF PRESENT ILLNESS
[FreeTextEntry1] : Patient is now 11 months postoperatively.  He feels well.  Shortness of breath and fatigability has dissipated.  He is exercising intermittently though not to the extent he was preoperatively.\par Admits to dietary indiscretions and some subsequent weight gain.\par \par There are no specific cardiac complaints at this time.\par \par There is no chest pain, cough, wheeze, edema.\par Blood pressure has been well within normal limits.\par Weight has remained a bit elevated.\par Exercise has been a bit less frequent.\par \par Immediate post op course September 2021 remarkable for paroxysmal atrial fibrillation.  \par \par .There has been an increase in his personal stressors due to ill parents ( Mom - passed; Dad -has lymphoma)   and he has had business problems.  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 enlargement of the ascending aorta measuring 4.8 cm\par Sev. enlargement of the left ventricle   EF = 50%\par RV EF 51%\par \par

## 2022-08-21 NOTE — REVIEW OF SYSTEMS
[Weight Gain (___ Lbs)] : [unfilled] ~Ulb weight gain [Weight Loss (___ Lbs)] : no recent weight loss [SOB] : no shortness of breath [Dyspnea on exertion] : dyspnea during exertion [Chest Discomfort] : no chest discomfort [Lower Ext Edema] : no extremity edema [Orthopnea] : no orthopnea [FreeTextEntry2] : Other than as documented here and in the HPI, the thirteen point ROS is negative

## 2022-09-13 LAB
ALBUMIN SERPL ELPH-MCNC: 4.6 G/DL
ALP BLD-CCNC: 74 U/L
ALT SERPL-CCNC: 22 U/L
ANION GAP SERPL CALC-SCNC: 11 MMOL/L
AST SERPL-CCNC: 23 U/L
BILIRUB SERPL-MCNC: 0.7 MG/DL
BUN SERPL-MCNC: 17 MG/DL
CALCIUM SERPL-MCNC: 9.4 MG/DL
CHLORIDE SERPL-SCNC: 102 MMOL/L
CHOLEST SERPL-MCNC: 151 MG/DL
CO2 SERPL-SCNC: 28 MMOL/L
CREAT SERPL-MCNC: 1.1 MG/DL
EGFR: 80 ML/MIN/1.73M2
GLUCOSE SERPL-MCNC: 103 MG/DL
HDLC SERPL-MCNC: 46 MG/DL
LDLC SERPL CALC-MCNC: 88 MG/DL
NONHDLC SERPL-MCNC: 105 MG/DL
POTASSIUM SERPL-SCNC: 4.7 MMOL/L
PROT SERPL-MCNC: 6.4 G/DL
SODIUM SERPL-SCNC: 141 MMOL/L
TRIGL SERPL-MCNC: 89 MG/DL

## 2022-09-15 LAB
ESTIMATED AVERAGE GLUCOSE: 111 MG/DL
HBA1C MFR BLD HPLC: 5.5 %

## 2023-03-29 ENCOUNTER — APPOINTMENT (OUTPATIENT)
Dept: CARDIOLOGY | Facility: CLINIC | Age: 53
End: 2023-03-29
Payer: COMMERCIAL

## 2023-03-29 VITALS
WEIGHT: 150 LBS | HEART RATE: 52 BPM | BODY MASS INDEX: 25.61 KG/M2 | OXYGEN SATURATION: 96 % | RESPIRATION RATE: 16 BRPM | SYSTOLIC BLOOD PRESSURE: 118 MMHG | DIASTOLIC BLOOD PRESSURE: 78 MMHG | HEIGHT: 64 IN

## 2023-03-29 PROCEDURE — 99214 OFFICE O/P EST MOD 30 MIN: CPT | Mod: 25

## 2023-03-29 PROCEDURE — 93000 ELECTROCARDIOGRAM COMPLETE: CPT

## 2023-03-29 PROCEDURE — 93306 TTE W/DOPPLER COMPLETE: CPT

## 2023-03-29 RX ORDER — PANTOPRAZOLE 40 MG/1
40 TABLET, DELAYED RELEASE ORAL DAILY
Qty: 30 | Refills: 5 | Status: DISCONTINUED | COMMUNITY
Start: 2021-09-20 | End: 2023-03-29

## 2023-03-29 NOTE — REASON FOR VISIT
[FreeTextEntry1] :  52 year old male  patient presents here for reevaluation s/p Aortic valve replacement using 23 bovine pericardial valve, 2700 TFX , ascending aortic replacement from sinotubular junction to several millimeters from clamp utilizing 34 Hemashield graft on 9/13/21\par \par Hx includes"\par 1.	A bicuspid aortic valve.\par 2.	Severe aortic insufficiency.\par 3.	A thoracic aortic aneurysm.\par 4.	Left ventricular dilatation.

## 2023-03-29 NOTE — REVIEW OF SYSTEMS
[Dyspnea on exertion] : dyspnea during exertion [Weight Loss (___ Lbs)] : [unfilled] ~Ulb weight loss [Weight Gain (___ Lbs)] : no recent weight gain [SOB] : no shortness of breath [Chest Discomfort] : no chest discomfort [Lower Ext Edema] : no extremity edema [Orthopnea] : no orthopnea [FreeTextEntry2] : Other than as documented here and in the HPI, the thirteen point ROS is negative

## 2023-03-29 NOTE — ASSESSMENT
[FreeTextEntry1] : ECG: Normal sinus rhythm at 60.  Nonspecific T wave abnormalities.\par \par Lab Data \par ------5/16/19-----3/24/21----3/11/22--9/13/22\par Chol--254----------165-------144--------151\par HDL---59------------61--------47---------46\par LDL--171------------90--------73---------88\par Tri---121------------72--------124--------89\par LFTs--WNL\par HGB 14.9\par Creat. 0.88\par \par Postop CT angio chest 11/19/2021:\par Sinus of Valsalva 3.1,x 3.1 x 3.0\par Ascending aorta graft dimensions 3.7 x 3.2 cm\par Aortic arch 2.6 x 2.7 cm\par Descending aorta 2.4 x 2.4 cm.\par \par Echocardiogram 3/29/2023:\par Postop asynchronous septum with normal LV function EF is 55%\par Well-seated aortic valve bioprosthesis with normal function\par Ascending aorta repair intact with no dilatation\par Very mild dilatation of the transverse arch.\par \par Echocardiogram 3/28/2022:\par Septum asynchronous.  Low normal global left ventricular systolic function\par Left ventricular ejection fraction 50 to 55%\par Bioprosthetic aortic valve well-seated without any insufficiency\par Ascending aorta and root appear to be within normal limits.\par No evidence of bone hypertension.\par Right ventricle borderline enlarged with low normal function.\par \par Echocardiogram 11/16/2021:\par Normal LV size and function ejection fraction 60%\par Well-seated aortic valve bioprosthesis\par Ascending aortic graft looks good maximum diameter 3.7.\par Compared to the prior echocardiogram LV size has decreased, systolic function has improved, the bioprosthesis is new, the presence of aortic graft is seen\par \par Echo 11/17/2020\par EF 60%\par Mod- Sev. left ventricle size.\par Trace Tricuspid regurgitation \par Aortic valve is bicuspid\par Mild. aortic stenosis\par Severe aortic regurgitation Mild tricuspid . \par Trace pulmonic  regurgitation \par Moderate dilation of the ascending aorta 4.7 cm  ( 4.40 in 2/2020)\par \par Echo 2/27/20\par EF 55-60%\par Dilated cardiomyopathy 6.5 cm\par Aortic valve is bicuspid\par Severe AI\par Mod. dilation of the ascending aorta measuring 4.40 cm\par \par \par 8/30/19 Cardiac MRI \par Moderate aortic insufficieny\par Mild enlargement of the aortic root measuring 4.2 cm\par Mod. enlargement of the ascending aorta measuring 4.8 cm\par Sev. enlargement of the left ventricle \par LV EF 50%\par \par A cardiac MRI performed 6/12/18 revealed the following:\par A dilated left ventricle with an ejection fraction of 52%.\par Mildly dilated right ventricle with normal function\par Left atrial dilatation\par A bicuspid aortic valve with severe regurgitation.\par A dilated ascending aorta maximal diameter 4.3 cm.\par \par \par Echocardiogram  11/12/18:\par Moderately dilated left ventricle with a left ventricular ejection fraction of 55-60%.\par A bicuspid aortic valve with severe insufficiency\par Moderately dilated ascending aorta unchanged from prior.\par Compared with the prior echocardiogram 5/31/18, there seems to be a decrease in left ventricular end-diastolic dimension and an improvement in the left ventricular ejection fraction.\par Echocardiogram 5/31/18\par Moderately dilated left ventricle with low normal ventricular systolic function. LVEF approximately 50-55%.\par Bicuspid aortic valve with severe  insufficiency\par Moderately severe dilatation of the ascending aorta measuring maximally 4.6 cm.\par \par Impression\par -All previously reported cardiac symptoms of shortness of breath and fatigability have resolved.\par -Postop left ventricular ejection fraction appears normal\par -Aortic valve bioprosthesis appears to be functional intact as it the aortic root\par - Postop pericardial aortic valve replacement for severe bicuspid valve aortic insufficiency and replacement of his ascending thoracic aorta due to dilatation.\par -Hyperlipidemia seems well controlled by the most recent blood work.\par -Ascending aortic graft and AVR all seem to be intact\par - substantial weight loss with dietary modification noted.\par \par Plan\par \par 1.  Instructed the patient about the benefits of a diet that restricts portion sizes, increases frequency of meals and consists of  vegetables, (more green and leafy),fruit and nuts, whole grains, lean proteins and limits carbohydrates and meat and dairy fats\par \par 2.  Continue quinapril at 5 mg a day.\par     Continue Toprol-XL  half tablet 12.5 mg a day and stagger the 2 meds\par \par 3.  Lipid and metabolic panel shortly\par \par 4.  patient was instructed to follow a symptom limited regimen of moderate aerobic exercise for 30 minutes 3 to 4 days a week. A warm up and cool down period are to be added to the regimen and small incremental changes can be made every few weeks. Any new symptoms of exercise related chest pain or dyspnea should be reported.\par \par 5.  Continue dietary plan\par \par 6.  Clinical follow-up here in 6 months\par \par \par

## 2023-03-29 NOTE — HISTORY OF PRESENT ILLNESS
[FreeTextEntry1] : Patient  feels well.  Shortness of breath and fatigability has dissipated.  He is exercising more regularly.\par Working with a nutritionist and has lost 26 pounds by intent\par \par There are no specific cardiac complaints at this time.\par \par There is no chest pain, cough, wheeze, edema.\par Blood pressure has been well within normal limits.\par \par Immediate post op course September 2021 remarkable for paroxysmal atrial fibrillation.  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 enlargement of the ascending aorta measuring 4.8 cm\par Sev. enlargement of the left ventricle   EF = 50%\par RV EF 51%\par \par

## 2023-03-30 LAB
ALBUMIN SERPL ELPH-MCNC: 4.3 G/DL
ALP BLD-CCNC: 63 U/L
ALT SERPL-CCNC: 17 U/L
ANION GAP SERPL CALC-SCNC: 9 MMOL/L
AST SERPL-CCNC: 19 U/L
BILIRUB SERPL-MCNC: 0.6 MG/DL
BUN SERPL-MCNC: 26 MG/DL
CALCIUM SERPL-MCNC: 9.7 MG/DL
CHLORIDE SERPL-SCNC: 102 MMOL/L
CHOLEST SERPL-MCNC: 169 MG/DL
CO2 SERPL-SCNC: 28 MMOL/L
CREAT SERPL-MCNC: 1.03 MG/DL
EGFR: 86 ML/MIN/1.73M2
ESTIMATED AVERAGE GLUCOSE: 108 MG/DL
FERRITIN SERPL-MCNC: 203 NG/ML
FOLATE SERPL-MCNC: 12.2 NG/ML
GLUCOSE SERPL-MCNC: 93 MG/DL
HBA1C MFR BLD HPLC: 5.4 %
HDLC SERPL-MCNC: 55 MG/DL
IRON SATN MFR SERPL: 26 %
IRON SERPL-MCNC: 68 UG/DL
LDLC SERPL CALC-MCNC: 101 MG/DL
MAGNESIUM SERPL-MCNC: 2.4 MG/DL
NONHDLC SERPL-MCNC: 114 MG/DL
POTASSIUM SERPL-SCNC: 4.9 MMOL/L
PROT SERPL-MCNC: 6.2 G/DL
PSA SERPL-MCNC: 0.89 NG/ML
SODIUM SERPL-SCNC: 139 MMOL/L
TIBC SERPL-MCNC: 266 UG/DL
TRIGL SERPL-MCNC: 67 MG/DL
TSH SERPL-ACNC: 1.52 UIU/ML
UIBC SERPL-MCNC: 198 UG/DL
VIT B12 SERPL-MCNC: 685 PG/ML

## 2023-04-21 RX ORDER — ASPIRIN ENTERIC COATED TABLETS 81 MG 81 MG/1
81 TABLET, DELAYED RELEASE ORAL DAILY
Qty: 90 | Refills: 3 | Status: ACTIVE | COMMUNITY
Start: 2021-09-20 | End: 1900-01-01

## 2023-05-23 ENCOUNTER — NON-APPOINTMENT (OUTPATIENT)
Age: 53
End: 2023-05-23

## 2023-09-05 ENCOUNTER — LABORATORY RESULT (OUTPATIENT)
Age: 53
End: 2023-09-05

## 2023-09-07 ENCOUNTER — APPOINTMENT (OUTPATIENT)
Dept: CARDIOLOGY | Facility: CLINIC | Age: 53
End: 2023-09-07
Payer: COMMERCIAL

## 2023-09-07 VITALS
SYSTOLIC BLOOD PRESSURE: 112 MMHG | OXYGEN SATURATION: 98 % | HEIGHT: 64 IN | HEART RATE: 56 BPM | DIASTOLIC BLOOD PRESSURE: 70 MMHG | WEIGHT: 164 LBS | BODY MASS INDEX: 28 KG/M2 | RESPIRATION RATE: 16 BRPM

## 2023-09-07 DIAGNOSIS — I35.1 NONRHEUMATIC AORTIC (VALVE) INSUFFICIENCY: ICD-10-CM

## 2023-09-07 DIAGNOSIS — Q23.1 CONGENITAL INSUFFICIENCY OF AORTIC VALVE: ICD-10-CM

## 2023-09-07 PROCEDURE — 99214 OFFICE O/P EST MOD 30 MIN: CPT | Mod: 25

## 2023-09-07 PROCEDURE — 93000 ELECTROCARDIOGRAM COMPLETE: CPT

## 2023-09-07 RX ORDER — QUINAPRIL HYDROCHLORIDE 5 MG/1
5 TABLET, FILM COATED ORAL DAILY
Qty: 90 | Refills: 3 | Status: DISCONTINUED | COMMUNITY
Start: 2021-10-08 | End: 2023-09-07

## 2023-09-07 NOTE — ASSESSMENT
[FreeTextEntry1] : ECG: Normal sinus rhythm at 56.  Nonspecific T wave abnormalities.  Lab Data  ------5/16/19-----3/24/21----3/11/22--9/13/22--3/30/23----9/5/23 Chol--254----------165-------144--------151-------169--------210 HDL---59------------61--------47---------46----------55---------56 LDL--171------------90--------73---------88---------101-------133 Tri---121------------72--------124--------89----------67---------118 LFTs--WNL HGB 14.9 Creat. 0.88  Postop CT angio chest 11/19/2021: Sinus of Valsalva 3.1,x 3.1 x 3.0 Ascending aorta graft dimensions 3.7 x 3.2 cm Aortic arch 2.6 x 2.7 cm Descending aorta 2.4 x 2.4 cm.  Echocardiogram 3/29/2023: Postop asynchronous septum with normal LV function EF is 55% Well-seated aortic valve bioprosthesis with normal function Ascending aorta repair intact with no dilatation Very mild dilatation of the transverse arch.  Echocardiogram 3/28/2022: Septum asynchronous.  Low normal global left ventricular systolic function Left ventricular ejection fraction 50 to 55% Bioprosthetic aortic valve well-seated without any insufficiency Ascending aorta and root appear to be within normal limits. No evidence of bone hypertension. Right ventricle borderline enlarged with low normal function.  Echocardiogram 11/16/2021: Normal LV size and function ejection fraction 60% Well-seated aortic valve bioprosthesis Ascending aortic graft looks good maximum diameter 3.7. Compared to the prior echocardiogram LV size has decreased, systolic function has improved, the bioprosthesis is new, the presence of aortic graft is seen  Echo 11/17/2020 EF 60% Mod- Sev. left ventricle size. Trace Tricuspid regurgitation  Aortic valve is bicuspid Mild. aortic stenosis Severe aortic regurgitation Mild tricuspid .  Trace pulmonic  regurgitation  Moderate dilation of the ascending aorta 4.7 cm  ( 4.40 in 2/2020)  Echo 2/27/20 EF 55-60% Dilated cardiomyopathy 6.5 cm Aortic valve is bicuspid Severe AI Mod. dilation of the ascending aorta measuring 4.40 cm   8/30/19 Cardiac MRI  Moderate aortic insufficieny Mild enlargement of the aortic root measuring 4.2 cm Mod. enlargement of the ascending aorta measuring 4.8 cm Sev. enlargement of the left ventricle  LV EF 50%  A cardiac MRI performed 6/12/18 revealed the following: A dilated left ventricle with an ejection fraction of 52%. Mildly dilated right ventricle with normal function Left atrial dilatation A bicuspid aortic valve with severe regurgitation. A dilated ascending aorta maximal diameter 4.3 cm.   Echocardiogram  11/12/18: Moderately dilated left ventricle with a left ventricular ejection fraction of 55-60%. A bicuspid aortic valve with severe insufficiency Moderately dilated ascending aorta unchanged from prior. Compared with the prior echocardiogram 5/31/18, there seems to be a decrease in left ventricular end-diastolic dimension and an improvement in the left ventricular ejection fraction. Echocardiogram 5/31/18 Moderately dilated left ventricle with low normal ventricular systolic function. LVEF approximately 50-55%. Bicuspid aortic valve with severe  insufficiency Moderately severe dilatation of the ascending aorta measuring maximally 4.6 cm.  Impression -All previously reported cardiac symptoms of shortness of breath and fatigability have resolved. -Postop left ventricular ejection fraction appears normal -Aortic valve bioprosthesis appears to be functional intact as it the aortic root - Postop pericardial aortic valve replacement for severe bicuspid valve aortic insufficiency and replacement of his ascending thoracic aorta due to dilatation. -Hyperlipidemia  -Ascending aortic graft and AVR all seem to be intact - substantial weight loss with dietary modification noted.  Plan  1.  Instructed the patient about the benefits of a diet that restricts portion sizes, increases frequency of meals and consists of  vegetables, (more green and leafy),fruit and nuts, whole grains, lean proteins and limits carbohydrates and meat and dairy fats  2.  Continue quinapril at 5 mg a day.     Continue Toprol-XL  half tablet 12.5 mg a day and stagger the 2 meds  3.  Lipid and metabolic panel in 3 months  4.  patient was instructed to follow a symptom limited regimen of moderate aerobic exercise for 30 minutes 3 to 4 days a week. A warm up and cool down period are to be added to the regimen and small incremental changes can be made every few weeks. Any new symptoms of exercise related chest pain or dyspnea should be reported.  5.  Continue dietary plan  6.  Echocardiogram and clinical follow-up here in 6 months

## 2023-09-07 NOTE — HISTORY OF PRESENT ILLNESS
[FreeTextEntry1] : Patient  feels well.  Exercising less regularly. 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  There are no specific cardiac complaints at this time.  There is no chest pain, cough, wheeze, edema. Blood pressure has been well within normal limits.  Immediate post op course September 2021 remarkable for paroxysmal atrial fibrillation.    .There has been an increase in his personal stressors due to ill parents ( Mom - passed; Dad -has lymphoma)     No chest pain, or palpitations.  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  8/30/19 Cardiac MRI  Moderate aortic insufficiency Mild enlargement of the aortic root measuring 4.2 cm Mod. enlargement of the ascending aorta measuring 4.8 cm Sev. enlargement of the left ventricle   EF = 50% RV EF 51%

## 2023-09-07 NOTE — REASON FOR VISIT
[FreeTextEntry1] :  53 year old male  patient presents here for reevaluation s/p Aortic valve replacement using 23 bovine pericardial valve, 2700 TFX , ascending aortic replacement from sinotubular junction to several millimeters from clamp utilizing 34 Hemashield graft on 9/13/21  Hx includes" 1.	A bicuspid aortic valve. 2.	Severe aortic insufficiency. 3.	A thoracic aortic aneurysm. 4.	Left ventricular dilatation. 5.     Hyperlipidemia

## 2023-09-07 NOTE — REVIEW OF SYSTEMS
[Weight Gain (___ Lbs)] : [unfilled] ~Ulb weight gain [Dyspnea on exertion] : dyspnea during exertion [Weight Loss (___ Lbs)] : no recent weight loss [SOB] : no shortness of breath [Chest Discomfort] : no chest discomfort [Lower Ext Edema] : no extremity edema [Orthopnea] : no orthopnea [FreeTextEntry2] : Other than as documented here and in the HPI, the thirteen point ROS is negative

## 2023-09-12 ENCOUNTER — RX RENEWAL (OUTPATIENT)
Age: 53
End: 2023-09-12

## 2023-09-12 RX ORDER — METOPROLOL SUCCINATE 25 MG/1
25 TABLET, EXTENDED RELEASE ORAL
Qty: 45 | Refills: 3 | Status: ACTIVE | COMMUNITY
Start: 2021-10-04 | End: 1900-01-01

## 2023-09-12 RX ORDER — ROSUVASTATIN CALCIUM 10 MG/1
10 TABLET, FILM COATED ORAL DAILY
Qty: 90 | Refills: 3 | Status: ACTIVE | COMMUNITY
Start: 2021-01-04 | End: 1900-01-01

## 2023-12-28 NOTE — REASON FOR VISIT
Detail Level: Detailed [FreeTextEntry1] :  51 year old male  patient presents here for reevaluation s/p Aortic valve replacement using 23 bovine pericardial valve, 2700 TFX , ascending aortic replacement from sinotubular junction to several millimeters from clamp utilizing 34 Hemashield graft on 9/13/21\par \par Hx includes"\par 1.	A bicuspid aortic valve.\par 2.	Severe aortic insufficiency.\par 3.	A thoracic aortic aneurysm.\par 4.	Left ventricular dilatation.

## 2024-03-04 ENCOUNTER — APPOINTMENT (OUTPATIENT)
Dept: CARDIOLOGY | Facility: CLINIC | Age: 54
End: 2024-03-04
Payer: COMMERCIAL

## 2024-03-04 LAB
ALBUMIN SERPL ELPH-MCNC: 4.6 G/DL
ALP BLD-CCNC: 66 U/L
ALT SERPL-CCNC: 24 U/L
ANION GAP SERPL CALC-SCNC: 11 MMOL/L
AST SERPL-CCNC: 24 U/L
BILIRUB SERPL-MCNC: 0.7 MG/DL
BUN SERPL-MCNC: 18 MG/DL
CALCIUM SERPL-MCNC: 9.4 MG/DL
CHLORIDE SERPL-SCNC: 101 MMOL/L
CHOLEST SERPL-MCNC: 150 MG/DL
CO2 SERPL-SCNC: 29 MMOL/L
CREAT SERPL-MCNC: 0.99 MG/DL
EGFR: 90 ML/MIN/1.73M2
GLUCOSE SERPL-MCNC: 100 MG/DL
HDLC SERPL-MCNC: 53 MG/DL
LDLC SERPL CALC-MCNC: 77 MG/DL
NONHDLC SERPL-MCNC: 97 MG/DL
POTASSIUM SERPL-SCNC: 4.7 MMOL/L
PROT SERPL-MCNC: 6.5 G/DL
SODIUM SERPL-SCNC: 141 MMOL/L
TRIGL SERPL-MCNC: 111 MG/DL

## 2024-03-04 PROCEDURE — 93306 TTE W/DOPPLER COMPLETE: CPT

## 2024-03-12 ENCOUNTER — APPOINTMENT (OUTPATIENT)
Dept: CARDIOLOGY | Facility: CLINIC | Age: 54
End: 2024-03-12
Payer: COMMERCIAL

## 2024-03-12 VITALS
RESPIRATION RATE: 16 BRPM | DIASTOLIC BLOOD PRESSURE: 80 MMHG | HEART RATE: 54 BPM | HEIGHT: 64 IN | WEIGHT: 168 LBS | BODY MASS INDEX: 28.68 KG/M2 | OXYGEN SATURATION: 98 % | SYSTOLIC BLOOD PRESSURE: 100 MMHG

## 2024-03-12 DIAGNOSIS — E78.5 HYPERLIPIDEMIA, UNSPECIFIED: ICD-10-CM

## 2024-03-12 DIAGNOSIS — I71.20 THORACIC AORTIC ANEURYSM, WITHOUT RUPTURE, UNSPECIFIED: ICD-10-CM

## 2024-03-12 DIAGNOSIS — G47.9 SLEEP DISORDER, UNSPECIFIED: ICD-10-CM

## 2024-03-12 DIAGNOSIS — I51.7 CARDIOMEGALY: ICD-10-CM

## 2024-03-12 DIAGNOSIS — Z95.2 PRESENCE OF PROSTHETIC HEART VALVE: ICD-10-CM

## 2024-03-12 DIAGNOSIS — Z95.828 PRESENCE OF OTHER VASCULAR IMPLANTS AND GRAFTS: ICD-10-CM

## 2024-03-12 PROCEDURE — G2211 COMPLEX E/M VISIT ADD ON: CPT

## 2024-03-12 PROCEDURE — 99214 OFFICE O/P EST MOD 30 MIN: CPT

## 2024-03-12 PROCEDURE — 93000 ELECTROCARDIOGRAM COMPLETE: CPT

## 2024-03-12 NOTE — ASSESSMENT
[FreeTextEntry1] : ECG sinus bradycardia 48 bpm.  No significant ST or T wave changes.  Lab Data  ------5/16/19-----3/24/21----3/11/22--9/13/22--3/30/23----9/5/23--3/1/24 Chol--254----------165-------144--------151-------169--------210------150 HDL---59------------61--------47---------46----------55---------56---------53 LDL--171------------90--------73---------88---------101-------133--------77 Tri---121------------72--------124--------89----------67---------118-------111 LFTs--WNL HGB 14.9 Creat. 0.88  Postop CT angio chest 11/19/2021: Sinus of Valsalva 3.1,x 3.1 x 3.0 Ascending aorta graft dimensions 3.7 x 3.2 cm Aortic arch 2.6 x 2.7 cm Descending aorta 2.4 x 2.4 cm.  Echocardiogram 3/4/2024: Postop septum but otherwise normal ejection fraction 55% Aortic valve bioprosthesis functioning well Ascending aorta Flo graft 3.9 cm diameter. Right ventricle borderline enlarged with mild to moderate TR but no evidence of pulm hypertension.  Echocardiogram 3/29/2023: Postop asynchronous septum with normal LV function EF is 55% Well-seated aortic valve bioprosthesis with normal function Ascending aorta repair intact with no dilatation Very mild dilatation of the transverse arch.  Echocardiogram 3/28/2022: Septum asynchronous.  Low normal global left ventricular systolic function Left ventricular ejection fraction 50 to 55% Bioprosthetic aortic valve well-seated without any insufficiency Ascending aorta and root appear to be within normal limits. No evidence of bone hypertension. Right ventricle borderline enlarged with low normal function.  Echocardiogram 11/16/2021: Normal LV size and function ejection fraction 60% Well-seated aortic valve bioprosthesis Ascending aortic graft looks good maximum diameter 3.7. Compared to the prior echocardiogram LV size has decreased, systolic function has improved, the bioprosthesis is new, the presence of aortic graft is seen  Echo 11/17/2020 EF 60% Mod- Sev. left ventricle size. Trace Tricuspid regurgitation  Aortic valve is bicuspid Mild. aortic stenosis Severe aortic regurgitation Mild tricuspid .  Trace pulmonic  regurgitation  Moderate dilation of the ascending aorta 4.7 cm  ( 4.40 in 2/2020)  Echo 2/27/20 EF 55-60% Dilated cardiomyopathy 6.5 cm Aortic valve is bicuspid Severe AI Mod. dilation of the ascending aorta measuring 4.40 cm   8/30/19 Cardiac MRI  Moderate aortic insufficieny Mild enlargement of the aortic root measuring 4.2 cm Mod. enlargement of the ascending aorta measuring 4.8 cm Sev. enlargement of the left ventricle  LV EF 50%  A cardiac MRI performed 6/12/18 revealed the following: A dilated left ventricle with an ejection fraction of 52%. Mildly dilated right ventricle with normal function Left atrial dilatation A bicuspid aortic valve with severe regurgitation. A dilated ascending aorta maximal diameter 4.3 cm.   Echocardiogram  11/12/18: Moderately dilated left ventricle with a left ventricular ejection fraction of 55-60%. A bicuspid aortic valve with severe insufficiency Moderately dilated ascending aorta unchanged from prior. Compared with the prior echocardiogram 5/31/18, there seems to be a decrease in left ventricular end-diastolic dimension and an improvement in the left ventricular ejection fraction. Echocardiogram 5/31/18 Moderately dilated left ventricle with low normal ventricular systolic function. LVEF approximately 50-55%. Bicuspid aortic valve with severe  insufficiency Moderately severe dilatation of the ascending aorta measuring maximally 4.6 cm.  Impression -All previously reported cardiac symptoms of shortness of breath and fatigability have resolved. -Postop left ventricular ejection fraction appears normal -Aortic valve bioprosthesis appears to be functional -Aorta graft appears to be intact at 3.9 cm -Hyperlipidemia: Better controlled with reduction in dietary fats -Ascending aortic graft and AVR all seem to be intact -No real change in weight at this time.  Plan  1.  Instructed the patient about the benefits of a diet that restricts portion sizes, increases frequency of meals and consists of  vegetables, (more green and leafy),fruit and nuts, whole grains, lean proteins and limits carbohydrates and meat and dairy fats.  2.  Continue quinapril at 5 mg a day. Continue Toprol-XL  half tablet 12.5 mg a day and stagger the 2 meds  3.  Lipid and metabolic panel in 6 months  4.  Patient was instructed to follow a symptom limited regimen of moderate aerobic exercise for 30 minutes 3 to 4 days a week. A warm up and cool down period are to be added to the regimen and small incremental changes can be made every few weeks. Any new symptoms of exercise related chest pain or dyspnea should be reported.  5.  Continue dietary plan.  6.  clinical follow-up here in 6 months.

## 2024-03-12 NOTE — PHYSICAL EXAM
[FreeTextEntry1] :                    Well appearing and nourished with no obvious deformities or distress.  Eyes:  No conjunctival injection and no xanthelasmas. HEENT:  Normocephalic.Normal oral mucosa. No pallor or cyanosis Neck:  No jugular venous distension. with normal A and V wave forms. No palpable adenopathy. Cardiovascular: Sternal incision healing well with no erythema or drainage. Normal rate and rhythm with normal S1, S2 and a grade 2/6 systolic murmur,  Distal arterial pulses are normal. No significant peripheral edema. Pulmonary:  Lungs are clear to auscultation and percussion. Normal respiratory pattern without any accessory muscle use Abdomen:  Soft, non-tender ; no palpable organomegaly or masses. Extremities: No digital clubbing, cyanosis or ischemic changes. Skin:  No skin lesions, rashes, ulcers or xanthomas. Psychiatric:  Alert and oriented to person, place and time. Left ear sensorineural hearing loss. Appropriate mood and affect.

## 2024-03-12 NOTE — REASON FOR VISIT
[FreeTextEntry1] :  53 year old male  patient presents here for reevaluation   He is s/p Aortic valve replacement using 23 bovine pericardial valve, 2700 TFX , ascending aortic replacement from sinotubular junction to several millimeters from clamp utilizing 34 Hemashield graft on 9/13/21  Hx includes" 1.	A bicuspid aortic valve. 2.	Severe aortic insufficiency. 3.	A thoracic aortic aneurysm. 4.	Left ventricular dilatation. 5.     Hyperlipidemia
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
## 2024-03-15 RX ORDER — LISINOPRIL 5 MG/1
5 TABLET ORAL DAILY
Qty: 90 | Refills: 2 | Status: ACTIVE | COMMUNITY
Start: 2023-05-23 | End: 1900-01-01

## 2024-04-16 ENCOUNTER — APPOINTMENT (OUTPATIENT)
Dept: GASTROENTEROLOGY | Facility: CLINIC | Age: 54
End: 2024-04-16
Payer: COMMERCIAL

## 2024-04-16 ENCOUNTER — TRANSCRIPTION ENCOUNTER (OUTPATIENT)
Age: 54
End: 2024-04-16

## 2024-04-16 VITALS
SYSTOLIC BLOOD PRESSURE: 100 MMHG | DIASTOLIC BLOOD PRESSURE: 65 MMHG | HEART RATE: 59 BPM | WEIGHT: 168 LBS | BODY MASS INDEX: 28.68 KG/M2 | HEIGHT: 64 IN

## 2024-04-16 DIAGNOSIS — Z86.010 PERSONAL HISTORY OF COLONIC POLYPS: ICD-10-CM

## 2024-04-16 DIAGNOSIS — Z80.6 FAMILY HISTORY OF LEUKEMIA: ICD-10-CM

## 2024-04-16 DIAGNOSIS — R14.3 FLATULENCE: ICD-10-CM

## 2024-04-16 DIAGNOSIS — I48.0 PAROXYSMAL ATRIAL FIBRILLATION: ICD-10-CM

## 2024-04-16 DIAGNOSIS — E66.3 OVERWEIGHT: ICD-10-CM

## 2024-04-16 DIAGNOSIS — Z87.11 PERSONAL HISTORY OF PEPTIC ULCER DISEASE: ICD-10-CM

## 2024-04-16 PROCEDURE — 99203 OFFICE O/P NEW LOW 30 MIN: CPT | Mod: 25

## 2024-04-16 PROCEDURE — 82272 OCCULT BLD FECES 1-3 TESTS: CPT

## 2024-04-16 RX ORDER — DIVALPROEX SODIUM 500 MG/1
500 TABLET, EXTENDED RELEASE ORAL DAILY
Refills: 0 | Status: DISCONTINUED | COMMUNITY
End: 2024-04-16

## 2024-04-16 RX ORDER — OMEPRAZOLE 20 MG/1
20 CAPSULE, DELAYED RELEASE ORAL
Qty: 90 | Refills: 3 | Status: ACTIVE | COMMUNITY
Start: 2024-04-16 | End: 1900-01-01

## 2024-04-16 RX ORDER — VITAMIN E ACETATE 670 MG
CAPSULE ORAL DAILY
Refills: 0 | Status: DISCONTINUED | COMMUNITY
End: 2024-04-16

## 2024-04-16 NOTE — CONSULT LETTER
[Dear  ___] : Dear  [unfilled], [Consult Letter:] : I had the pleasure of evaluating your patient, [unfilled]. [Please see my note below.] : Please see my note below. [Consult Closing:] : Thank you very much for allowing me to participate in the care of this patient.  If you have any questions, please do not hesitate to contact me. [Sincerely,] : Sincerely, [FreeTextEntry3] : Satish Keller M.D.

## 2024-04-16 NOTE — HISTORY OF PRESENT ILLNESS
[FreeTextEntry1] : Wagner has history of colonic tubular adenoma, with only hemorrhoids noted at last colonoscopy July 2014.  He has had long-standing flatulence regardless of what he eats.  He recently stopped taking iron.  He underwent aortic valve and ascending aorta replacement in 2021 (complicated by paroxysmal atrial fibrillation), is maintained on baby aspirin regularly, receives antibiotic prophylaxis.  He has remote history of esophageal and gastric ulcers.  Other than the flatulence, he denies GI symptoms at this time.

## 2024-04-16 NOTE — REVIEW OF SYSTEMS
[Negative] : Heme/Lymph [As Noted in HPI] : as noted in HPI [Bloating (gassiness)] : bloating [FreeTextEntry5] : Replaced aortic valve and roots of the aorta

## 2024-04-16 NOTE — ASSESSMENT
[FreeTextEntry1] : 1.  History of colonic tubular adenoma, with only hemorrhoids at last colonoscopy July 2014--rule out metachronous colorectal neoplasia. 2.  Remote history of gastric and esophageal ulcers.  Prior EGD and colonoscopy negative for celiac disease, IBD. 3.  History of iron deficiency anemia.  Capsule endoscopy August 2010 essentially normal. 4.  Excessive flatulence likely diet-related, such as from lactose intolerance, beans, etc. Iron could be contributory. 5.  Status post aortic valve and ascending aorta replacement 2021 complicated by PAF, on ASA. 6.  Overweight. 7.  History of anxiety/depression. 8.  Hypertension. 9.  Hyperlipidemia. 10. Status post surgery for left acoustic neuroma 2012.  Plan: 1.  Interim medical records reviewed. 2.  Dr. Sheriff to forward latest labs for my review. 3.  Because of history of gastric ulcers and need to take aspirin indefinitely, he should be taking PPI as co-therapy indefinitely, as well. Rx for omeprazole 20 mg given.  "PPI Information Sheet" given and discussed. 4.  Agree with need for surveillance colonoscopy, with antibiotic prophylaxis--Procedure, rationale, anesthesia plan, and MiraLAX prep instructions were again reviewed and brochure given. Agree with stopping iron for now. 5.  Lactase with each meal. 6.  [facility] brochure on "Gas in the Digestive Tract" given and discussed.

## 2024-06-05 ENCOUNTER — RX RENEWAL (OUTPATIENT)
Age: 54
End: 2024-06-05

## 2024-06-05 RX ORDER — ASPIRIN 81 MG/1
81 TABLET, COATED ORAL
Qty: 90 | Refills: 3 | Status: ACTIVE | COMMUNITY
Start: 2024-06-05 | End: 1900-01-01

## 2024-07-01 ENCOUNTER — APPOINTMENT (OUTPATIENT)
Dept: GASTROENTEROLOGY | Facility: CLINIC | Age: 54
End: 2024-07-01
Payer: COMMERCIAL

## 2024-07-01 PROCEDURE — 45378 DIAGNOSTIC COLONOSCOPY: CPT

## 2024-09-03 ENCOUNTER — APPOINTMENT (OUTPATIENT)
Dept: CARDIOLOGY | Facility: CLINIC | Age: 54
End: 2024-09-03
Payer: COMMERCIAL

## 2024-09-03 VITALS
RESPIRATION RATE: 16 BRPM | OXYGEN SATURATION: 98 % | DIASTOLIC BLOOD PRESSURE: 80 MMHG | SYSTOLIC BLOOD PRESSURE: 138 MMHG | BODY MASS INDEX: 29.88 KG/M2 | HEIGHT: 64 IN | WEIGHT: 175 LBS | HEART RATE: 56 BPM

## 2024-09-03 DIAGNOSIS — Z95.828 PRESENCE OF OTHER VASCULAR IMPLANTS AND GRAFTS: ICD-10-CM

## 2024-09-03 DIAGNOSIS — I48.0 PAROXYSMAL ATRIAL FIBRILLATION: ICD-10-CM

## 2024-09-03 DIAGNOSIS — E78.5 HYPERLIPIDEMIA, UNSPECIFIED: ICD-10-CM

## 2024-09-03 DIAGNOSIS — Q23.1 CONGENITAL INSUFFICIENCY OF AORTIC VALVE: ICD-10-CM

## 2024-09-03 PROCEDURE — G2211 COMPLEX E/M VISIT ADD ON: CPT

## 2024-09-03 PROCEDURE — 93000 ELECTROCARDIOGRAM COMPLETE: CPT

## 2024-09-03 PROCEDURE — 99214 OFFICE O/P EST MOD 30 MIN: CPT

## 2024-09-03 NOTE — REASON FOR VISIT
[FreeTextEntry1] :  54 year old male  patient presents here for reevaluation   He is s/p Aortic valve replacement using 23 bovine pericardial valve, 2700 TFX , ascending aortic replacement from sinotubular junction to several millimeters from clamp utilizing 34 Hemashield graft on 9/13/21  Hx includes" 1.	A bicuspid aortic valve. 2.	Severe aortic insufficiency. 3.	A thoracic aortic aneurysm. 4.	Left ventricular dilatation. 5.     Hyperlipidemia

## 2024-09-03 NOTE — HISTORY OF PRESENT ILLNESS
[FreeTextEntry1] : Patient  feels well.  Exercising less regularly. Concerns are about continued weight gain.  Admits that there are some dietary indiscretions.  There are no specific cardiac complaints at this time.  There is no chest pain, cough, wheeze, edema. Blood pressure has been well within normal limits.  Immediate post op course September 2021 remarkable for paroxysmal atrial fibrillation.    .There has been an increase in his personal stressors due to ill parents ( Mom - passed; Dad -has lymphoma)     No chest pain, or palpitations.  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  8/30/19 Cardiac MRI  Moderate aortic insufficiency Mild enlargement of the aortic root measuring 4.2 cm Mod. enlargement of the ascending aorta measuring 4.8 cm Sev. enlargement of the left ventricle   EF = 50% RV EF 51%

## 2024-09-03 NOTE — ASSESSMENT
[FreeTextEntry1] : ECG sinus bradycardia 56 bpm.  No significant ST or T wave changes.  Lab Data  ------5/16/19-----3/24/21----3/11/22--9/13/22--3/30/23----9/5/23--3/1/24--8/16/24 Chol--254----------165-------144--------151-------169--------210------150------150 HDL---59------------61--------47---------46----------55---------56---------53-------46 LDL--171------------90--------73---------88---------101-------133--------77--------85 Tri---121------------72--------124--------89----------67---------118-------111------100 LFTs--WNL HGB 14.9 Creat. 0.88  Postop CT angio chest 11/19/2021: Sinus of Valsalva 3.1,x 3.1 x 3.0 Ascending aorta graft dimensions 3.7 x 3.2 cm Aortic arch 2.6 x 2.7 cm Descending aorta 2.4 x 2.4 cm.  Echocardiogram 3/4/2024: Postop septum but otherwise normal ejection fraction 55% Aortic valve bioprosthesis functioning well Ascending aorta Flo graft 3.9 cm diameter. Right ventricle borderline enlarged with mild to moderate TR but no evidence of pulm hypertension.  Echocardiogram 3/29/2023: Postop asynchronous septum with normal LV function EF is 55% Well-seated aortic valve bioprosthesis with normal function Ascending aorta repair intact with no dilatation Very mild dilatation of the transverse arch.  Echocardiogram 3/28/2022: Septum asynchronous.  Low normal global left ventricular systolic function Left ventricular ejection fraction 50 to 55% Bioprosthetic aortic valve well-seated without any insufficiency Ascending aorta and root appear to be within normal limits. No evidence of bone hypertension. Right ventricle borderline enlarged with low normal function.  Echocardiogram 11/16/2021: Normal LV size and function ejection fraction 60% Well-seated aortic valve bioprosthesis Ascending aortic graft looks good maximum diameter 3.7. Compared to the prior echocardiogram LV size has decreased, systolic function has improved, the bioprosthesis is new, the presence of aortic graft is seen  Echo 11/17/2020 EF 60% Mod- Sev. left ventricle size. Trace Tricuspid regurgitation  Aortic valve is bicuspid Mild. aortic stenosis Severe aortic regurgitation Mild tricuspid .  Trace pulmonic  regurgitation  Moderate dilation of the ascending aorta 4.7 cm  ( 4.40 in 2/2020)  Echo 2/27/20 EF 55-60% Dilated cardiomyopathy 6.5 cm Aortic valve is bicuspid Severe AI Mod. dilation of the ascending aorta measuring 4.40 cm   8/30/19 Cardiac MRI  Moderate aortic insufficieny Mild enlargement of the aortic root measuring 4.2 cm Mod. enlargement of the ascending aorta measuring 4.8 cm Sev. enlargement of the left ventricle  LV EF 50%  A cardiac MRI performed 6/12/18 revealed the following: A dilated left ventricle with an ejection fraction of 52%. Mildly dilated right ventricle with normal function Left atrial dilatation A bicuspid aortic valve with severe regurgitation. A dilated ascending aorta maximal diameter 4.3 cm.   Echocardiogram  11/12/18: Moderately dilated left ventricle with a left ventricular ejection fraction of 55-60%. A bicuspid aortic valve with severe insufficiency Moderately dilated ascending aorta unchanged from prior. Compared with the prior echocardiogram 5/31/18, there seems to be a decrease in left ventricular end-diastolic dimension and an improvement in the left ventricular ejection fraction. Echocardiogram 5/31/18 Moderately dilated left ventricle with low normal ventricular systolic function. LVEF approximately 50-55%. Bicuspid aortic valve with severe  insufficiency Moderately severe dilatation of the ascending aorta measuring maximally 4.6 cm.  Impression -All previously reported cardiac symptoms of shortness of breath and fatigability have resolved. -Postop left ventricular ejection fraction appears normal -Aortic valve bioprosthesis appears to be functional -Aorta graft appears to be intact at 3.9 cm -Hyperlipidemia: Better controlled with reduction in dietary fats -Ascending aortic graft and AVR all seem to be intact -Weight has continued to increase and BMI is now 30.  Plan  1.  Instructed the patient about the benefits of a diet that restricts portion sizes, increases frequency of meals and consists of  vegetables, (more green and leafy),fruit and nuts, whole grains, lean proteins and limits carbohydrates and meat and dairy fats.  2.  Continue quinapril at 5 mg a day. Continue Toprol-XL  half tablet 12.5 mg a day and stagger the 2 meds  3.  Lipid and metabolic panel in 6 months  4.  Patient was instructed to follow a symptom limited regimen of moderate aerobic exercise for 30 minutes 3 to 4 days a week. A warm up and cool down period are to be added to the regimen and small incremental changes can be made every few weeks. Any new symptoms of exercise related chest pain or dyspnea should be reported.  5.  Repeat echocardiogram March 2025.  6.  clinical follow-up here in 6 months.

## 2025-01-16 ENCOUNTER — NON-APPOINTMENT (OUTPATIENT)
Age: 55
End: 2025-01-16

## 2025-03-03 ENCOUNTER — APPOINTMENT (OUTPATIENT)
Dept: OTOLARYNGOLOGY | Facility: CLINIC | Age: 55
End: 2025-03-03
Payer: COMMERCIAL

## 2025-03-03 VITALS
WEIGHT: 167 LBS | DIASTOLIC BLOOD PRESSURE: 81 MMHG | HEIGHT: 64 IN | SYSTOLIC BLOOD PRESSURE: 118 MMHG | BODY MASS INDEX: 28.51 KG/M2 | HEART RATE: 58 BPM

## 2025-03-03 DIAGNOSIS — H90.5 UNSPECIFIED SENSORINEURAL HEARING LOSS: ICD-10-CM

## 2025-03-03 DIAGNOSIS — D33.3 BENIGN NEOPLASM OF CRANIAL NERVES: ICD-10-CM

## 2025-03-03 PROCEDURE — 92567 TYMPANOMETRY: CPT

## 2025-03-03 PROCEDURE — 99203 OFFICE O/P NEW LOW 30 MIN: CPT

## 2025-03-03 PROCEDURE — 92557 COMPREHENSIVE HEARING TEST: CPT

## 2025-03-19 ENCOUNTER — APPOINTMENT (OUTPATIENT)
Dept: CARDIOLOGY | Facility: CLINIC | Age: 55
End: 2025-03-19
Payer: COMMERCIAL

## 2025-03-19 PROCEDURE — 93306 TTE W/DOPPLER COMPLETE: CPT

## 2025-04-24 ENCOUNTER — APPOINTMENT (OUTPATIENT)
Dept: CARDIOLOGY | Facility: CLINIC | Age: 55
End: 2025-04-24
Payer: COMMERCIAL

## 2025-04-24 VITALS
WEIGHT: 164 LBS | HEART RATE: 59 BPM | RESPIRATION RATE: 16 BRPM | BODY MASS INDEX: 28 KG/M2 | HEIGHT: 64 IN | OXYGEN SATURATION: 97 % | SYSTOLIC BLOOD PRESSURE: 110 MMHG | DIASTOLIC BLOOD PRESSURE: 70 MMHG

## 2025-04-24 DIAGNOSIS — Z95.2 PRESENCE OF PROSTHETIC HEART VALVE: ICD-10-CM

## 2025-04-24 DIAGNOSIS — Z95.828 PRESENCE OF OTHER VASCULAR IMPLANTS AND GRAFTS: ICD-10-CM

## 2025-04-24 DIAGNOSIS — E78.5 HYPERLIPIDEMIA, UNSPECIFIED: ICD-10-CM

## 2025-04-24 DIAGNOSIS — Q23.81 BICUSPID AORTIC VALVE: ICD-10-CM

## 2025-04-24 PROCEDURE — 93000 ELECTROCARDIOGRAM COMPLETE: CPT

## 2025-04-24 PROCEDURE — 99214 OFFICE O/P EST MOD 30 MIN: CPT

## 2025-04-24 PROCEDURE — G2211 COMPLEX E/M VISIT ADD ON: CPT | Mod: NC

## 2025-06-02 NOTE — PATIENT PROFILE ADULT - ARRIVAL FROM
Home